# Patient Record
Sex: MALE | Race: BLACK OR AFRICAN AMERICAN | NOT HISPANIC OR LATINO | Employment: OTHER | RURAL
[De-identification: names, ages, dates, MRNs, and addresses within clinical notes are randomized per-mention and may not be internally consistent; named-entity substitution may affect disease eponyms.]

---

## 2022-11-15 DIAGNOSIS — M25.562 PAIN IN LEFT KNEE: Primary | ICD-10-CM

## 2022-11-28 ENCOUNTER — CLINICAL SUPPORT (OUTPATIENT)
Dept: REHABILITATION | Facility: HOSPITAL | Age: 62
End: 2022-11-28
Payer: OTHER GOVERNMENT

## 2022-11-28 DIAGNOSIS — M25.662 DECREASED RANGE OF MOTION OF LEFT KNEE: ICD-10-CM

## 2022-11-28 DIAGNOSIS — R29.898 WEAKNESS OF LEFT LEG: ICD-10-CM

## 2022-11-28 DIAGNOSIS — M25.562 ACUTE PAIN OF LEFT KNEE: Primary | ICD-10-CM

## 2022-11-28 DIAGNOSIS — M25.562 PAIN IN LEFT KNEE: ICD-10-CM

## 2022-11-28 PROCEDURE — 97110 THERAPEUTIC EXERCISES: CPT

## 2022-11-28 PROCEDURE — 97161 PT EVAL LOW COMPLEX 20 MIN: CPT

## 2022-11-28 NOTE — PLAN OF CARE
[facility]   Physical Therapy Initial Evaluation    Date: 11/28/2022   Name: Rayray Hutchins  Clinic Number: [id number]    Therapy Diagnosis: Left Knee Pain   Physician: [facility]     Physician Orders: PT Eval and Treat   Medical Diagnosis from Referral: Left Knee Pain   Evaluation Date: 11/28/2022  Updated Plan of Care Due : 12/27/2022  Authorization Period Expiration: 11/15/2023  Plan of Care Expiration: 1/27/2023  Visit # / Visits authorized: 1/ 16    Time In: 3:05 pm   Time Out: 4:00 pm 5  Total Appointment Time (timed & untimed codes): 55 minutes    Precautions: Standard    Subjective   Date of onset: 9/1/2022  History of current condition - Rayray reports: He underwent Left TKA July 2019, Revision October 2020, and he has undergone 4 different Left Knee Manipulation and Scar tissue removal procedures. Last Manipulation was in 2021. He is here today due to continued Left Knee Pain and dysfunction. Left Knee presents with Flexion contracture and Left Knee significantly larger than the Right Knee.      Medical History: DM    Surgical History:   Rayray Hutchins has hx of Left TKA, Left Total Knee Revision, and 4 manipulations/scar tissue removal procedures     Medications:   Rayray currently has no medications in their medication list.    Allergies:   Review of patient's allergies indicates:  Not on File     Imaging, None here     Prior Therapy: None at this facility since 2021  Social History:  lives alone  Occupation: Retired from [employer]; [employer]    Prior Level of Function: He was able to ambulate several years prior to the Left Knee Replacement  Current Level of Function: Since the first knee surgery in 2019 he has had continued pain and dysfunction in the Left Knee    Pain:  Current 8/10, worst 10/10, best 6/10   Location: left knee    Description: Aching, Dull, Throbbing, Tight, and Numb  Aggravating Factors: Prolonged standing and walking; trying to flex the knee   Easing  "Factors: nothing; He does take pain meds but this does not really help     Patients goals: "I just want to see if I can get my knee to bend more. I need some kind of relief from this pain."    Objective         Observation : Left Knee much larger than Right knee most likely due to build up of scar tissue    Incision :    Well healed years ago       Girth Measurements :      Right Lower Extremity :  Mid Patella 36  cm        Left Lower Extremity :  Mid Patella 42  cm          Range of Motion/Strength :                  Left Extremity                                                                        Right Extremity   AROM PROM Strength  Location  AROM    PROM   Strength        Hip      Flexion (140)                       Extension (10)                       Internal Rotation (40)                       External Rotation (50)                       Abduction (45)                       Adduction (30)       68   70  4/5   Knee    Flexion (140)  135   5/5     0   4/5                Extension (0)   0    5/5        Ankle   Dorsiflexion (20)                        Plantar Flexion (50)                        Inversion (35)                        Eversion (25)                     Functional Impairments :  Patient has difficulty with sit to stand especially from low chair. He shifts his weight to the Right in order to stand. Patient is noted to have decreased stance time and antalgic gait on the Left. Patient has decreased heel strike and hits foot flat with initial contact on the Left. He tends to keep the knee slightly flexed throughout gait cycle. Patient has decreased step/stride length resulting in slow michelle.        Limitation/Restriction for FOTO Intake Knee Survey    Therapist reviewed FOTO scores for Rayray Burnham Cuong on 11/28/2022.   FOTO documents entered into EPIC - see Media section.    Limitation Score: 65%         TREATMENT   Treatment Time In: 3:45 pm   Treatment Time Out: 3:55 pm   Total Treatment time " (time-based codes) separate from Evaluation: 10 minutes      Rayray received the treatments listed below:  THERAPEUTIC EXERCISES to develop strength, endurance, ROM, and flexibility for 10 minutes including :  Initiated the basic Home Exercise Program including exercises and stretches.      Home Exercises and Patient Education Provided    Education provided:   - Discussed the findings from the Evaluation, Reviewed the Plan of Care, and Instructed patient on their Home Exercise Program      Written Home Exercises Provided: yes.  Exercises were reviewed and Rayray was able to demonstrate them prior to the end of the session.  Rayray demonstrated good  understanding of the education provided.     See EMR under Patient Instructions for exercises provided 11/28/2022.    Assessment   Rayray is a 62 y.o. male referred to outpatient Physical Therapy with a medical diagnosis of Left Knee Pain. Rayray reports: He underwent Left TKA July 2019, Revision October 2020, and he has undergone 4 different Left Knee Manipulation and Scar tissue removal procedures. Last Manipulation was in 2021. He is here today due to continued Left Knee Pain and dysfunction. Left Knee presents with Flexion contracture and Left Knee significantly larger than the Right Knee. Patient presents with decreased Left Knee Flexion Range of Motion with hard end feel at end range. Left Knee is much larger than the Right most likely due to build up of scar tissue. Patient has difficulty with sit to stand especially from low chair. He shifts his weight to the Right in order to stand. Patient is noted to have decreased stance time and antalgic gait on the Left. Patient has decreased heel strike and hits foot flat with initial contact on the Left. He tends to keep the knee slightly flexed throughout gait cycle. Patient has decreased step/stride length resulting in slow michelle.  Patient will require Physical Therapy Intervention to address all deficits and work  toward completion of all goals set. Therapist will refer patient back to MD upon completion of Therapy for further assessment if pain and dysfunction persist.       Patient prognosis is Good.   Patientt will benefit from skilled outpatient Physical Therapy to address the deficits stated above and in the chart below, provide patient /family education, and to maximize patientt's level of independence.     Plan of care discussed with patient: Yes  Patient's spiritual, cultural and educational needs considered and patient is agreeable to the plan of care and goals as stated below:     Anticipated Barriers for therapy: Long Standing Left Knee Flexion Contracture    Goals:  Short Term Goals: 3 weeks   1. Independent with Home Exercise Program   2. Increase Left Knee Range of Motion to 0 Degrees to 90 Degrees  3. Increase Left Knee Strength to grossly 4+/5  4. Patient will ambulate 500 feet with Normal Gait pattern with complaints of pain Less than or Equal to 4/10.      Long Term Goals: 6 weeks   1. Patient will increase Left Knee Strength to grossly 5/5  2. Increase Left Knee Range of Motion to 0 Degrees to 100 Degrees  3. Patient will ambulate 1000+ feet with Good Heel strike on the Left with complaints of pain Less than or Equal to 3/10.        Plan   Plan of care Certification: 11/28/2022 to 1/27/2023.    Outpatient Physical Therapy 2 times weekly for 8 weeks to include the following interventions: Electrical Stimulation to increase strength and decrease pain and inflammation as neeeded, Gait Training, Manual Therapy, Moist Heat/ Ice, Neuromuscular Re-ed, Patient Education, Therapeutic Activities, Therapeutic Exercise, and Ultrasound.     RADHA GARCIA, PT, DPT      I CERTIFY THE NEED FOR THESE SERVICES FURNISHED UNDER THIS PLAN OF TREATMENT AND WHILE UNDER MY CARE.    Physician's comments:      Physician's Signature: ___________________________________________________

## 2022-11-28 NOTE — PROGRESS NOTES
See Plan of Care     Sup Visit performed today with IZABEL Perez and IZABEL Jackson.  All goals and treatment plan reviewed. Will work toward completion of all goals set.     First Treatment May Include :       Knee Exercises      Bike/Elliptical/Stairmaster    Calf Stretch    Hamstring Stretch    Quad Stretch    Cybex Leg Press - Bilateral    Cybex Leg Press - Single     Cybex Knee Extension    Cybex Hamstring Curls    Cybex Hip - Abduction    Cybex Hip - Flexion     Cybex Hip - Extension    Cable Knee TKE            Seated Knee Flexion Stretch         Prone Knee Flexion Stretch         NK Table

## 2022-11-30 ENCOUNTER — CLINICAL SUPPORT (OUTPATIENT)
Dept: REHABILITATION | Facility: HOSPITAL | Age: 62
End: 2022-11-30
Payer: OTHER GOVERNMENT

## 2022-11-30 DIAGNOSIS — M25.662 DECREASED RANGE OF MOTION OF LEFT KNEE: Primary | ICD-10-CM

## 2022-11-30 DIAGNOSIS — R29.898 WEAKNESS OF LEFT LEG: ICD-10-CM

## 2022-11-30 DIAGNOSIS — M25.562 ACUTE PAIN OF LEFT KNEE: ICD-10-CM

## 2022-11-30 PROCEDURE — 97140 MANUAL THERAPY 1/> REGIONS: CPT

## 2022-11-30 PROCEDURE — 97110 THERAPEUTIC EXERCISES: CPT

## 2022-11-30 NOTE — PROGRESS NOTES
[facility]   Physical Therapy Treatment Note     Name: Rayray Hutchins  Clinic Number: [id number]      Therapy Diagnosis: Left Knee Pain   Physician: Dylan Provider    Visit Date: 11/30/2022     Physician Orders: PT Eval and Treat   Medical Diagnosis from Referral: Left Knee Pain   Evaluation Date: 11/28/2022  Updated Plan of Care Due : 12/27/2022  Authorization Period Expiration: 11/15/2023  Plan of Care Expiration: 1/27/2023  Visit # / Visits authorized: 2/ 16      Time In: 1:00 pm (Front office had trouble signing him in the system)  Time Out: 2:01 pm   Total Billable Time: 60 minutes    Precautions: Standard  Functional Level at time of Evaluation:  Since the first knee surgery in 2019 he has had continued pain and dysfunction in the Left Knee     Subjective     Pt reports: He hasn't been able to do all the stretches every day because he was gone to [place], but he has done them some.  He was compliant with home exercise program.    Pain: 7/10  Location: left knee      Objective       Rayray received therapeutic exercises to develop strength, endurance, ROM, and flexibility for 45 minutes including:      Knee Exercises        NuStep  5 Min    Calf Stretch  4 x 15 sec    Hamstring Stretch  4 x 15 sec    Quad Stretch  8 x 15 sec    Cybex Leg Press - Bilateral  3 x 10 with 8 plates    Cybex Leg Press - Left Only   3 x 10 with 5 plates    Cybex Knee Extension     Cybex Hamstring Curls  3 x 10 with 4 plates    Cybex Hip - Abduction     Cybex Hip - Flexion      Cybex Hip - Extension     Cable Knee TKE                 Seated Knee Flexion Stretch            Prone Knee Flexion Stretch   4 x 30 sec hold with green strap         NK Table   Manual Therapy                                             Rayray received the following manual therapy techniques: Joint mobilizations, Myofacial release, and Soft tissue Mobilization were applied to the: Left Knee for 15 minutes,  including:  NK Table 4.3 and 14.3 lb weights on arm;  x 5 LAQs followed by a 2 Minute flexion stretch - this is repeated 4 times  Manual Passive Range of Motion for Extension and Flexion with main focus on flexion     Rayray participated in neuromuscular re-education activities to improve: Balance, Coordination, Kinesthetic, and Proprioception for 0 minutes. The following activities were included:  Single Leg Stance on Left               Home Exercises Provided and Patient Education Provided     Education provided: Reviewed his home exercise and stretching program and instructed him to stretch diligently at home    Written Home Exercises Provided: yes.  Exercises were reviewed and Rayray was able to demonstrate them prior to the end of the session.  Rayray demonstrated good  understanding of the education provided.     See EMR under Patient Instructions for exercises provided prior visit.    Assessment     Today is the patient's first visit since the Evaluation. Instructed patient on proper form for all exercises. Had patient warm up and stretch followed by Quad and Hamstring strengthening exercises. Patient was then placed on the NK chair for more aggressive stretching. 4.3 lb plate and 14.3 lb plate placed on arm;  LAQs x 5 followed by a 2 Minute flexion stretch - this was repeated 4 times. Therapist was able to increase knee flexion to 87 degrees following this. Also had patient perform prone knee flexion stretch with green strap 4 x 30 seconds. Will continue to work with patient on increasing knee flexion Range of Motion and strength.           PMH from the Evaluation : Rayray is a 62 y.o. male referred to outpatient Physical Therapy with a medical diagnosis of Left Knee Pain. Rayray reports: He underwent Left TKA July 2019, Revision October 2020, and he has undergone 4 different Left Knee Manipulation and Scar tissue removal procedures. Last Manipulation was in 2021. He is here today due to continued Left Knee  Pain and dysfunction. Left Knee presents with Flexion contracture and Left Knee significantly larger than the Right Knee. Patient presents with decreased Left Knee Flexion Range of Motion with hard end feel at end range. Left Knee is much larger than the Right most likely due to build up of scar tissue. Patient has difficulty with sit to stand especially from low chair. He shifts his weight to the Right in order to stand. Patient is noted to have decreased stance time and antalgic gait on the Left. Patient has decreased heel strike and hits foot flat with initial contact on the Left. He tends to keep the knee slightly flexed throughout gait cycle. Patient has decreased step/stride length resulting in slow michelle.  Patient will require Physical Therapy Intervention to address all deficits and work toward completion of all goals set. Therapist will refer patient back to MD upon completion of Therapy for further assessment if pain and dysfunction persist.          Rayray Is progressing well towards his goals.   Pt prognosis is Good.     Pt will continue to benefit from skilled outpatient physical therapy to address the deficits listed in the problem list box on initial evaluation, provide pt/family education and to maximize pt's level of independence in the home and community environment.     Pt's spiritual, cultural and educational needs considered and pt agreeable to plan of care and goals.     Anticipated Barriers for therapy: Long Standing Left Knee Flexion Contracture     Goals:  Short Term Goals: 3 weeks   1. Independent with Home Exercise Program   2. Increase Left Knee Range of Motion to 0 Degrees to 90 Degrees  3. Increase Left Knee Strength to grossly 4+/5  4. Patient will ambulate 500 feet with Normal Gait pattern with complaints of pain Less than or Equal to 4/10.       Long Term Goals: 6 weeks   1. Patient will increase Left Knee Strength to grossly 5/5  2. Increase Left Knee Range of Motion to 0 Degrees  to 100 Degrees  3. Patient will ambulate 1000+ feet with Good Heel strike on the Left with complaints of pain Less than or Equal to 3/10.       Plan     Plan of care Certification: 11/28/2022 to 1/27/2023.     Outpatient Physical Therapy 2 times weekly for 8 weeks to include the following interventions: Electrical Stimulation to increase strength and decrease pain and inflammation as neeeded, Gait Training, Manual Therapy, Moist Heat/ Ice, Neuromuscular Re-ed, Patient Education, Therapeutic Activities, Therapeutic Exercise, and Ultrasound.        RADHA GARCIA, PT, DPT   11/30/2022

## 2022-12-05 ENCOUNTER — CLINICAL SUPPORT (OUTPATIENT)
Dept: REHABILITATION | Facility: HOSPITAL | Age: 62
End: 2022-12-05
Payer: OTHER GOVERNMENT

## 2022-12-05 DIAGNOSIS — R29.898 WEAKNESS OF LEFT LEG: ICD-10-CM

## 2022-12-05 DIAGNOSIS — M25.662 DECREASED RANGE OF MOTION OF LEFT KNEE: Primary | ICD-10-CM

## 2022-12-05 DIAGNOSIS — M25.562 ACUTE PAIN OF LEFT KNEE: ICD-10-CM

## 2022-12-05 PROCEDURE — 97140 MANUAL THERAPY 1/> REGIONS: CPT | Mod: CQ

## 2022-12-05 PROCEDURE — 97110 THERAPEUTIC EXERCISES: CPT | Mod: CQ

## 2022-12-05 NOTE — PROGRESS NOTES
[facility]   Physical Therapy Treatment Note     Name: Rayray Hutchins  Clinic Number: [id number]      Therapy Diagnosis: Left Knee Pain   Physician: Regina Almonte FNP    Visit Date: 12/5/2022     Physician Orders: PT Eval and Treat   Medical Diagnosis from Referral: Left Knee Pain   Evaluation Date: 11/28/2022  Updated Plan of Care Due : 12/27/2022  Authorization Period Expiration: 11/15/2023  Plan of Care Expiration: 1/27/2023  Visit # / Visits authorized: 3 / 16  PTA VISIT 1/5      Time In: 3:19 pm   Time Out: 4:15 pm   Total Billable Time: 56 minutes    Precautions: Standard  Functional Level at time of Evaluation:  Since the first knee surgery in 2019 he has had continued pain and dysfunction in the Left Knee     Subjective     Pt reports: 7/10 pain noted today  He was compliant with home exercise program.    Pain: 7/10  Location: left knee      Objective       Rayray received therapeutic exercises to develop strength, endurance, ROM, and flexibility for 15 minutes including:      Knee Exercises        NuStep  5 Min    Calf Stretch  4 x 15 sec    Hamstring Stretch  4 x 15 sec    Quad Stretch  8 x 15 sec    Cybex Leg Press - Bilateral    Cybex Leg Press - Left Only     Cybex Knee Extension     Cybex Hamstring Curls     Cybex Hip - Abduction     Cybex Hip - Flexion      Cybex Hip - Extension     Cable Knee TKE                 Seated Knee Flexion Stretch            Prone Knee Flexion Stretch   4 x 30 sec hold with green strap         NK Table   Manual Therapy                                             Rayray received the following manual therapy techniques: Joint mobilizations, Myofacial release, and Soft tissue Mobilization were applied to the: Left Knee for 30 minutes, including:  Graston  Manual Prone Quad  Progressive Flexion Bridges    Rayray participated in neuromuscular re-education activities to improve: Balance, Coordination, Kinesthetic, and Proprioception for  0 minutes. The following activities were included:  Single Leg Stance on Left               Home Exercises Provided and Patient Education Provided     Education provided: Reviewed his home exercise and stretching program and instructed him to stretch diligently at home    Written Home Exercises Provided: yes.  Exercises were reviewed and Rayray was able to demonstrate them prior to the end of the session.  Rayray demonstrated good  understanding of the education provided.     See EMR under Patient Instructions for exercises provided prior visit.    Assessment     Pt has moderate quad tightness/restriction during PROM. No change in ROM with lateral/medial MWM. Graston performed today with GT-4, 5, and 3 instruments. Moderate adhesions noted along VMO and medial/lateral to incision. Essentially no change in ROM after all manual techniques. Discussed with PT and patient about possible splinting (cindi splint and JOSHUA splint) for pt to use to help regain flexion ROM. Pt is willing to try any means to regain mobility. Will talk to appropriate people to facilitate this for patient.          PMH from the Evaluation : Rayray is a 62 y.o. male referred to outpatient Physical Therapy with a medical diagnosis of Left Knee Pain. Rayray reports: He underwent Left TKA July 2019, Revision October 2020, and he has undergone 4 different Left Knee Manipulation and Scar tissue removal procedures. Last Manipulation was in 2021. He is here today due to continued Left Knee Pain and dysfunction. Left Knee presents with Flexion contracture and Left Knee significantly larger than the Right Knee. Patient presents with decreased Left Knee Flexion Range of Motion with hard end feel at end range. Left Knee is much larger than the Right most likely due to build up of scar tissue. Patient has difficulty with sit to stand especially from low chair. He shifts his weight to the Right in order to stand. Patient is noted to have decreased stance time  and antalgic gait on the Left. Patient has decreased heel strike and hits foot flat with initial contact on the Left. He tends to keep the knee slightly flexed throughout gait cycle. Patient has decreased step/stride length resulting in slow michelle.  Patient will require Physical Therapy Intervention to address all deficits and work toward completion of all goals set. Therapist will refer patient back to MD upon completion of Therapy for further assessment if pain and dysfunction persist.          Rayray Is progressing well towards his goals.   Pt prognosis is Good.     Pt will continue to benefit from skilled outpatient physical therapy to address the deficits listed in the problem list box on initial evaluation, provide pt/family education and to maximize pt's level of independence in the home and community environment.     Pt's spiritual, cultural and educational needs considered and pt agreeable to plan of care and goals.     Anticipated Barriers for therapy: Long Standing Left Knee Flexion Contracture     Goals:  Short Term Goals: 3 weeks   1. Independent with Home Exercise Program   2. Increase Left Knee Range of Motion to 0 Degrees to 90 Degrees  3. Increase Left Knee Strength to grossly 4+/5  4. Patient will ambulate 500 feet with Normal Gait pattern with complaints of pain Less than or Equal to 4/10.       Long Term Goals: 6 weeks   1. Patient will increase Left Knee Strength to grossly 5/5  2. Increase Left Knee Range of Motion to 0 Degrees to 100 Degrees  3. Patient will ambulate 1000+ feet with Good Heel strike on the Left with complaints of pain Less than or Equal to 3/10.       Plan     Plan of care Certification: 11/28/2022 to 1/27/2023.     Outpatient Physical Therapy 2 times weekly for 8 weeks to include the following interventions: Electrical Stimulation to increase strength and decrease pain and inflammation as neeeded, Gait Training, Manual Therapy, Moist Heat/ Ice, Neuromuscular Re-ed, Patient  Education, Therapeutic Activities, Therapeutic Exercise, and Ultrasound.        Derrick Carr, PTA, DPT   12/5/2022

## 2022-12-07 ENCOUNTER — CLINICAL SUPPORT (OUTPATIENT)
Dept: REHABILITATION | Facility: HOSPITAL | Age: 62
End: 2022-12-07
Payer: OTHER GOVERNMENT

## 2022-12-07 DIAGNOSIS — M25.562 ACUTE PAIN OF LEFT KNEE: ICD-10-CM

## 2022-12-07 DIAGNOSIS — M25.662 DECREASED RANGE OF MOTION OF LEFT KNEE: Primary | ICD-10-CM

## 2022-12-07 DIAGNOSIS — R29.898 WEAKNESS OF LEFT LEG: ICD-10-CM

## 2022-12-07 PROCEDURE — 97140 MANUAL THERAPY 1/> REGIONS: CPT | Mod: CQ

## 2022-12-07 PROCEDURE — 97110 THERAPEUTIC EXERCISES: CPT | Mod: CQ

## 2022-12-07 NOTE — PROGRESS NOTES
"  [facility]   Physical Therapy Treatment Note     Name: Rayray Hutchins  Clinic Number: [id number]      Therapy Diagnosis: Left Knee Pain   Physician: Dylan, Provider    Visit Date: 12/7/2022     Physician Orders: PT Eval and Treat   Medical Diagnosis from Referral: Left Knee Pain   Evaluation Date: 11/28/2022  Updated Plan of Care Due : 12/27/2022  Authorization Period Expiration: 11/15/2023  Plan of Care Expiration: 1/27/2023  Visit # / Visits authorized: 4/16  PTA VISIT 2/5      Time In: 1:02 pm   Time Out: 1:55 pm   Total Billable Time: 53 minutes    Precautions: Standard  Functional Level at time of Evaluation:  Since the first knee surgery in 2019 he has had continued pain and dysfunction in the Left Knee     Subjective     Pt reports: 7/10 pain noted today  He was compliant with home exercise program.    Pain: 7/10  Location: left knee      Objective       Rayray received therapeutic exercises to develop strength, endurance, ROM, and flexibility for 23 minutes including:      Knee Exercises        NuStep  5 Min    Calf Stretch  4 x 15 sec    Hamstring Stretch  4 x 15 sec    Quad Stretch  8 x 15 sec    Cybex Leg Press - Bilateral  10 plates 30x   Cybex Leg Press - Left Only   5 plates 30x   Cybex Knee Extension     Cybex Hamstring Curls  5 plates 30x   Cybex Hip - Abduction     Cybex Hip - Flexion      Cybex Hip - Extension     Cable Knee TKE                 Seated Knee Flexion Stretch            Prone Knee Flexion Stretch   4 x 30 sec hold with green strap         NK Table   Manual Therapy           Lat Stepdowns  6" 30x    Eccentric Stepdowns  30x                          Rayray received the following manual therapy techniques: Joint mobilizations, Myofacial release, and Soft tissue Mobilization were applied to the: Left Knee for 30 minutes, including:  Graston  Manual Prone Quad  Progressive Flexion Bridges    Rayray participated in neuromuscular re-education " activities to improve: Balance, Coordination, Kinesthetic, and Proprioception for 0 minutes. The following activities were included:  Single Leg Stance on Left               Home Exercises Provided and Patient Education Provided     Education provided: Reviewed his home exercise and stretching program and instructed him to stretch diligently at home    Written Home Exercises Provided: yes.  Exercises were reviewed and Rayray was able to demonstrate them prior to the end of the session.  Rayray demonstrated good  understanding of the education provided.     See EMR under Patient Instructions for exercises provided prior visit.    Assessment     No change in ROM/tightness today. Gifty performed today utilizing same techniques and instruments. Mild adhesions noted along VMO and border of incision. Progressed LE strengthening today with no increased pain noted. In communication with Bernardino and JOSHUA Wade about a possible consult. Pt wishes to try JOSHUA Splint so will work on appropriate paperwork to facilitate this. Educated pt to be diligent with his HEP and ROM every day.           PMH from the Evaluation : Rayray is a 62 y.o. male referred to outpatient Physical Therapy with a medical diagnosis of Left Knee Pain. Rayray reports: He underwent Left TKA July 2019, Revision October 2020, and he has undergone 4 different Left Knee Manipulation and Scar tissue removal procedures. Last Manipulation was in 2021. He is here today due to continued Left Knee Pain and dysfunction. Left Knee presents with Flexion contracture and Left Knee significantly larger than the Right Knee. Patient presents with decreased Left Knee Flexion Range of Motion with hard end feel at end range. Left Knee is much larger than the Right most likely due to build up of scar tissue. Patient has difficulty with sit to stand especially from low chair. He shifts his weight to the Right in order to stand. Patient is noted to have decreased stance time  and antalgic gait on the Left. Patient has decreased heel strike and hits foot flat with initial contact on the Left. He tends to keep the knee slightly flexed throughout gait cycle. Patient has decreased step/stride length resulting in slow michelle.  Patient will require Physical Therapy Intervention to address all deficits and work toward completion of all goals set. Therapist will refer patient back to MD upon completion of Therapy for further assessment if pain and dysfunction persist.          Rayray Is progressing well towards his goals.   Pt prognosis is Good.     Pt will continue to benefit from skilled outpatient physical therapy to address the deficits listed in the problem list box on initial evaluation, provide pt/family education and to maximize pt's level of independence in the home and community environment.     Pt's spiritual, cultural and educational needs considered and pt agreeable to plan of care and goals.     Anticipated Barriers for therapy: Long Standing Left Knee Flexion Contracture     Goals:  Short Term Goals: 3 weeks   1. Independent with Home Exercise Program   2. Increase Left Knee Range of Motion to 0 Degrees to 90 Degrees  3. Increase Left Knee Strength to grossly 4+/5  4. Patient will ambulate 500 feet with Normal Gait pattern with complaints of pain Less than or Equal to 4/10.       Long Term Goals: 6 weeks   1. Patient will increase Left Knee Strength to grossly 5/5  2. Increase Left Knee Range of Motion to 0 Degrees to 100 Degrees  3. Patient will ambulate 1000+ feet with Good Heel strike on the Left with complaints of pain Less than or Equal to 3/10.       Plan     Plan of care Certification: 11/28/2022 to 1/27/2023.     Outpatient Physical Therapy 2 times weekly for 8 weeks to include the following interventions: Electrical Stimulation to increase strength and decrease pain and inflammation as neeeded, Gait Training, Manual Therapy, Moist Heat/ Ice, Neuromuscular Re-ed, Patient  Education, Therapeutic Activities, Therapeutic Exercise, and Ultrasound.        Derrick Carr, PTA,   12/7/2022

## 2022-12-12 ENCOUNTER — CLINICAL SUPPORT (OUTPATIENT)
Dept: REHABILITATION | Facility: HOSPITAL | Age: 62
End: 2022-12-12
Payer: OTHER GOVERNMENT

## 2022-12-12 DIAGNOSIS — R29.898 WEAKNESS OF LEFT LEG: ICD-10-CM

## 2022-12-12 DIAGNOSIS — M25.562 ACUTE PAIN OF LEFT KNEE: ICD-10-CM

## 2022-12-12 DIAGNOSIS — M25.662 DECREASED RANGE OF MOTION OF LEFT KNEE: Primary | ICD-10-CM

## 2022-12-12 PROCEDURE — 97110 THERAPEUTIC EXERCISES: CPT | Mod: CQ

## 2022-12-12 NOTE — PROGRESS NOTES
"  [facility]   Physical Therapy Treatment Note     Name: Rayray Hutchins  Clinic Number: [id number]      Therapy Diagnosis: Left Knee Pain   Physician: Regina Almonte FNP    Visit Date: 12/12/2022     Physician Orders: PT Eval and Treat   Medical Diagnosis from Referral: Left Knee Pain   Evaluation Date: 11/28/2022  Updated Plan of Care Due : 12/27/2022  Authorization Period Expiration: 11/15/2023  Plan of Care Expiration: 1/27/2023  Visit # / Visits authorized: 5/16  PTA VISIT 3/5      Time In: 1:48 pm   Time Out: 2:32 pm   Total Billable Time: 44 minutes    Precautions: Standard  Functional Level at time of Evaluation:  Since the first knee surgery in 2019 he has had continued pain and dysfunction in the Left Knee     Subjective     Pt reports: 7/10 pain noted today. No change in ROM noted  He was compliant with home exercise program.    Pain: 7/10  Location: left knee      Objective       Rayray received therapeutic exercises to develop strength, endurance, ROM, and flexibility for 38 minutes including:      Knee Exercises        NuStep  5 Min    Calf Stretch  5x20 seconds   Hamstring Stretch  5x20 seconds   Quad Stretch  8 x 15 sec    Cybex Leg Press - Bilateral  10 plates 30x   Cybex Leg Press - Left Only   7 plates 30x   Cybex Knee Extension     Cybex Hamstring Curls  5 plates 30x SINGLE LEG   Cybex Hip - Abduction     Cybex Hip - Flexion      Cybex Hip - Extension     Cable Knee TKE                 Seated Knee Flexion Stretch            Prone Knee Flexion Stretch   4 x 30 sec hold with green strap         NK Table   Manual Therapy           Lat Stepdowns  6" 30x    Eccentric Stepdowns  30x                          Rayray received the following manual therapy techniques: Joint mobilizations, Myofacial release, and Soft tissue Mobilization were applied to the: Left Knee for 0 minutes, including:  Graston  Manual Prone Quad  Progressive Flexion Bridges    Rayray " participated in neuromuscular re-education activities to improve: Balance, Coordination, Kinesthetic, and Proprioception for 0 minutes. The following activities were included:  Single Leg Stance on Left               Home Exercises Provided and Patient Education Provided     Education provided: Reviewed his home exercise and stretching program and instructed him to stretch diligently at home    Written Home Exercises Provided: yes.  Exercises were reviewed and Rayray was able to demonstrate them prior to the end of the session.  Rayray demonstrated good  understanding of the education provided.     See EMR under Patient Instructions for exercises provided prior visit.    Assessment     Pt tolerated all therapeutic exercises today with pain/swelling noted. Continued limitations in flexion ROM with tightness felt across quad tendon. Progressed LE strengthening per pt tolerance. Measurements taken today for JOSHUA Splint and pt given information packet and [facility]. Will continue working on ROM/strength until splint is approved and fit for patient. Educated pt to call if any additional documents required on our end for JOSHUA splint.           PMH from the Evaluation : Rayray is a 62 y.o. male referred to outpatient Physical Therapy with a medical diagnosis of Left Knee Pain. Rayray reports: He underwent Left TKA July 2019, Revision October 2020, and he has undergone 4 different Left Knee Manipulation and Scar tissue removal procedures. Last Manipulation was in 2021. He is here today due to continued Left Knee Pain and dysfunction. Left Knee presents with Flexion contracture and Left Knee significantly larger than the Right Knee. Patient presents with decreased Left Knee Flexion Range of Motion with hard end feel at end range. Left Knee is much larger than the Right most likely due to build up of scar tissue. Patient has difficulty with sit to stand especially from low chair. He shifts his weight to the  Right in order to stand. Patient is noted to have decreased stance time and antalgic gait on the Left. Patient has decreased heel strike and hits foot flat with initial contact on the Left. He tends to keep the knee slightly flexed throughout gait cycle. Patient has decreased step/stride length resulting in slow michelle.  Patient will require Physical Therapy Intervention to address all deficits and work toward completion of all goals set. Therapist will refer patient back to MD upon completion of Therapy for further assessment if pain and dysfunction persist.          Rayray Is progressing well towards his goals.   Pt prognosis is Good.     Pt will continue to benefit from skilled outpatient physical therapy to address the deficits listed in the problem list box on initial evaluation, provide pt/family education and to maximize pt's level of independence in the home and community environment.     Pt's spiritual, cultural and educational needs considered and pt agreeable to plan of care and goals.     Anticipated Barriers for therapy: Long Standing Left Knee Flexion Contracture     Goals:  Short Term Goals: 3 weeks   1. Independent with Home Exercise Program   2. Increase Left Knee Range of Motion to 0 Degrees to 90 Degrees  3. Increase Left Knee Strength to grossly 4+/5  4. Patient will ambulate 500 feet with Normal Gait pattern with complaints of pain Less than or Equal to 4/10.       Long Term Goals: 6 weeks   1. Patient will increase Left Knee Strength to grossly 5/5  2. Increase Left Knee Range of Motion to 0 Degrees to 100 Degrees  3. Patient will ambulate 1000+ feet with Good Heel strike on the Left with complaints of pain Less than or Equal to 3/10.       Plan     Plan of care Certification: 11/28/2022 to 1/27/2023.     Outpatient Physical Therapy 2 times weekly for 8 weeks to include the following interventions: Electrical Stimulation to increase strength and decrease pain and inflammation as neeeded, Gait  Training, Manual Therapy, Moist Heat/ Ice, Neuromuscular Re-ed, Patient Education, Therapeutic Activities, Therapeutic Exercise, and Ultrasound.        Derrick Carr, PTA,   12/12/2022

## 2022-12-16 ENCOUNTER — CLINICAL SUPPORT (OUTPATIENT)
Dept: REHABILITATION | Facility: HOSPITAL | Age: 62
End: 2022-12-16
Payer: OTHER GOVERNMENT

## 2022-12-16 DIAGNOSIS — M25.662 DECREASED RANGE OF MOTION OF LEFT KNEE: Primary | ICD-10-CM

## 2022-12-16 DIAGNOSIS — M25.562 ACUTE PAIN OF LEFT KNEE: ICD-10-CM

## 2022-12-16 DIAGNOSIS — R29.898 WEAKNESS OF LEFT LEG: ICD-10-CM

## 2022-12-16 PROCEDURE — 97140 MANUAL THERAPY 1/> REGIONS: CPT

## 2022-12-16 PROCEDURE — 97110 THERAPEUTIC EXERCISES: CPT

## 2022-12-16 NOTE — PROGRESS NOTES
"  [facility]   Physical Therapy Treatment Note     Name: Rayray Hutchins  Clinic Number: [id number]      Therapy Diagnosis: Left Knee Pain   Physician: Dylan, Provider    Visit Date: 12/16/2022     Physician Orders: PT Eval and Treat   Medical Diagnosis from Referral: Left Knee Pain   Evaluation Date: 11/28/2022  Updated Plan of Care Due : 12/27/2022  Authorization Period Expiration: 11/15/2023  Plan of Care Expiration: 1/27/2023  Visit # / Visits authorized: 6/16  PTA VISIT 3/5      Time In: 10:00 am    Time Out: 10:55 am  Total Billable Time: 55 minutes    Precautions: Standard  Functional Level at time of Evaluation:  Since the first knee surgery in 2019 he has had continued pain and dysfunction in the Left Knee     Subjective     Pt reports: that he is trying to get [facility] to write order for JOSHUA.   He was compliant with home exercise program.    Pain: 6/10  Location: left knee      Objective       Rayray received therapeutic exercises to develop strength, endurance, ROM, and flexibility for 30 minutes including:      Knee Exercises        NuStep  5 Min    Calf Stretch  5x20 seconds   Hamstring Stretch  5x20 seconds   Quad Stretch  8 x 15 sec    Cybex Leg Press - Bilateral  10 plates 3 x 10   Cybex Leg Press - Left Only   7 plates 3 x 10   Cybex Knee Extension  3 x 10 3 plates   Cybex Hamstring Curls  5 plates 3 x 10 SINGLE LEG   Cybex Hip - Abduction     Cybex Hip - Flexion      Cybex Hip - Extension     Cable Knee TKE                 Seated Knee Flexion Stretch            Prone Knee Flexion Stretch   4 x 30 sec hold with green strap         NK Table   Manual Therapy           Lat Stepdowns  6" 3 x 10    Eccentric Stepdowns  30x                          Rayray received the following manual therapy techniques: Joint mobilizations, Myofacial release, and Soft tissue Mobilization were applied to the: Left Knee for 25 minutes, including:  Graston with GT to left quad in " prolonged stretch position  Manual Prone Quad  Progressive Flexion Bridges  NK chair for progressive creep stretch     Rayray participated in neuromuscular re-education activities to improve: Balance, Coordination, Kinesthetic, and Proprioception for 0 minutes. The following activities were included:  Single Leg Stance on Left               Home Exercises Provided and Patient Education Provided     Education provided: Reviewed his home exercise and stretching program and instructed him to stretch diligently at home    Written Home Exercises Provided: yes.  Exercises were reviewed and Rayray was able to demonstrate them prior to the end of the session.  Rayray demonstrated good  understanding of the education provided.     See EMR under Patient Instructions for exercises provided prior visit.    Assessment     Patient presents with increased edema left knee today. Added Cybex knee extensions today. Flexion today was 80 degrees after warm up and 95 degrees after Graston and NK chair creep stretch. Patient instructed to perform prolonged stretching over the weekend.      PMH from the Evaluation : Rayray is a 62 y.o. male referred to outpatient Physical Therapy with a medical diagnosis of Left Knee Pain. Rayray reports: He underwent Left TKA July 2019, Revision October 2020, and he has undergone 4 different Left Knee Manipulation and Scar tissue removal procedures. Last Manipulation was in 2021. He is here today due to continued Left Knee Pain and dysfunction. Left Knee presents with Flexion contracture and Left Knee significantly larger than the Right Knee. Patient presents with decreased Left Knee Flexion Range of Motion with hard end feel at end range. Left Knee is much larger than the Right most likely due to build up of scar tissue. Patient has difficulty with sit to stand especially from low chair. He shifts his weight to the Right in order to stand. Patient is noted to have decreased stance time and antalgic gait  on the Left. Patient has decreased heel strike and hits foot flat with initial contact on the Left. He tends to keep the knee slightly flexed throughout gait cycle. Patient has decreased step/stride length resulting in slow michelle.  Patient will require Physical Therapy Intervention to address all deficits and work toward completion of all goals set. Therapist will refer patient back to MD upon completion of Therapy for further assessment if pain and dysfunction persist.          Rayray Is progressing well towards his goals.   Pt prognosis is Good.     Pt will continue to benefit from skilled outpatient physical therapy to address the deficits listed in the problem list box on initial evaluation, provide pt/family education and to maximize pt's level of independence in the home and community environment.     Pt's spiritual, cultural and educational needs considered and pt agreeable to plan of care and goals.     Anticipated Barriers for therapy: Long Standing Left Knee Flexion Contracture     Goals:  Short Term Goals: 3 weeks   1. Independent with Home Exercise Program   2. Increase Left Knee Range of Motion to 0 Degrees to 90 Degrees  3. Increase Left Knee Strength to grossly 4+/5  4. Patient will ambulate 500 feet with Normal Gait pattern with complaints of pain Less than or Equal to 4/10.       Long Term Goals: 6 weeks   1. Patient will increase Left Knee Strength to grossly 5/5  2. Increase Left Knee Range of Motion to 0 Degrees to 100 Degrees  3. Patient will ambulate 1000+ feet with Good Heel strike on the Left with complaints of pain Less than or Equal to 3/10.       Plan     Plan of care Certification: 11/28/2022 to 1/27/2023.     Outpatient Physical Therapy 2 times weekly for 8 weeks to include the following interventions: Electrical Stimulation to increase strength and decrease pain and inflammation as neeeded, Gait Training, Manual Therapy, Moist Heat/ Ice, Neuromuscular Re-ed, Patient Education,  Therapeutic Activities, Therapeutic Exercise, and Ultrasound.        HERMELINDA GARCIA, PT, MLT    12/16/2022

## 2023-01-03 ENCOUNTER — CLINICAL SUPPORT (OUTPATIENT)
Dept: REHABILITATION | Facility: HOSPITAL | Age: 63
End: 2023-01-03
Payer: OTHER GOVERNMENT

## 2023-01-03 DIAGNOSIS — R29.898 WEAKNESS OF LEFT LEG: ICD-10-CM

## 2023-01-03 DIAGNOSIS — M25.662 DECREASED RANGE OF MOTION OF LEFT KNEE: Primary | ICD-10-CM

## 2023-01-03 DIAGNOSIS — M25.562 ACUTE PAIN OF LEFT KNEE: ICD-10-CM

## 2023-01-03 PROCEDURE — 97110 THERAPEUTIC EXERCISES: CPT | Mod: CQ

## 2023-01-03 NOTE — PROGRESS NOTES
"  [facility]   Physical Therapy Treatment Note     Name: Rayray Hutchins  Clinic Number: [id number]      Therapy Diagnosis: Left Knee Pain   Physician: Dylan, Provider    Visit Date: 1/3/2023     Physician Orders: PT Eval and Treat   Medical Diagnosis from Referral: Left Knee Pain   Evaluation Date: 11/28/2022  Updated Plan of Care Due : 12/27/2022  Authorization Period Expiration: 11/15/2023  Plan of Care Expiration: 1/27/2023  Visit # / Visits authorized: 7/16  PTA VISIT 1/5      Time In: 1:00 pm    Time Out: 1:43 pm  Total Billable Time: 43 minutes    Precautions: Standard  Functional Level at time of Evaluation:  Since the first knee surgery in 2019 he has had continued pain and dysfunction in the Left Knee     Subjective     Pt reports: soreness today. Awaiting MD to approve JOSHUA splint  He was compliant with home exercise program.    Pain: 6/10  Location: left knee      Objective       Rayray received therapeutic exercises to develop strength, endurance, ROM, and flexibility for 40 minutes including:      Knee Exercises        NuStep  5 Min    Calf Stretch  5x20 seconds   Hamstring Stretch  5x20 seconds   Quad Stretch  8 x 15 sec    Cybex Leg Press - Bilateral  10 plates 3 x 10   Cybex Leg Press - Left Only   7 plates 3 x 10   Cybex Knee Extension  3 x 10 3 plates   Cybex Hamstring Curls  5 plates 3 x 10 SINGLE LEG   Cybex Hip - Abduction     Cybex Hip - Flexion      Cybex Hip - Extension     Cable Knee TKE     Supine Ball Rolls into Flexion   30x   SL progressive bridges   3x10              Prone Knee Flexion Stretch   4 x 30 sec hold with green strap         NK Table   Manual Therapy           Lat Stepdowns  6" 3 x 10    Eccentric Stepdowns  30x    Progressive LAQ with Band  [id number] x 10 sec hold                    Rayray received the following manual therapy techniques: Joint mobilizations, Myofacial release, and Soft tissue Mobilization were applied to the: Left Knee " for 0 minutes, including:  Graston with GT to left quad in prolonged stretch position  Manual Prone Quad  Progressive Flexion Bridges  NK chair for progressive creep stretch     Rayray participated in neuromuscular re-education activities to improve: Balance, Coordination, Kinesthetic, and Proprioception for 0 minutes. The following activities were included:  Single Leg Stance on Left               Home Exercises Provided and Patient Education Provided     Education provided: Reviewed his home exercise and stretching program and instructed him to stretch diligently at home    Written Home Exercises Provided: yes.  Exercises were reviewed and Rayray was able to demonstrate them prior to the end of the session.  Rayray demonstrated good  understanding of the education provided.     See EMR under Patient Instructions for exercises provided prior visit.    Assessment     Pt's knee continues to be very tight into flexion. Extension ROM in holding and maintaining neutral. Progressed LE strengthening with fatigue noted. Ended with LAQ isometrics with pull into end range flexion during rest breaks. Pt is scheduled to talk to MD in two weeks about JOSHUA splint approval.       PMH from the Evaluation : Rayray is a 62 y.o. male referred to outpatient Physical Therapy with a medical diagnosis of Left Knee Pain. Rayray reports: He underwent Left TKA July 2019, Revision October 2020, and he has undergone 4 different Left Knee Manipulation and Scar tissue removal procedures. Last Manipulation was in 2021. He is here today due to continued Left Knee Pain and dysfunction. Left Knee presents with Flexion contracture and Left Knee significantly larger than the Right Knee. Patient presents with decreased Left Knee Flexion Range of Motion with hard end feel at end range. Left Knee is much larger than the Right most likely due to build up of scar tissue. Patient has difficulty with sit to stand especially from low chair. He shifts his  weight to the Right in order to stand. Patient is noted to have decreased stance time and antalgic gait on the Left. Patient has decreased heel strike and hits foot flat with initial contact on the Left. He tends to keep the knee slightly flexed throughout gait cycle. Patient has decreased step/stride length resulting in slow michelle.  Patient will require Physical Therapy Intervention to address all deficits and work toward completion of all goals set. Therapist will refer patient back to MD upon completion of Therapy for further assessment if pain and dysfunction persist.          Rayray Is progressing well towards his goals.   Pt prognosis is Good.     Pt will continue to benefit from skilled outpatient physical therapy to address the deficits listed in the problem list box on initial evaluation, provide pt/family education and to maximize pt's level of independence in the home and community environment.     Pt's spiritual, cultural and educational needs considered and pt agreeable to plan of care and goals.     Anticipated Barriers for therapy: Long Standing Left Knee Flexion Contracture     Goals:  Short Term Goals: 3 weeks   1. Independent with Home Exercise Program   2. Increase Left Knee Range of Motion to 0 Degrees to 90 Degrees  3. Increase Left Knee Strength to grossly 4+/5  4. Patient will ambulate 500 feet with Normal Gait pattern with complaints of pain Less than or Equal to 4/10.       Long Term Goals: 6 weeks   1. Patient will increase Left Knee Strength to grossly 5/5  2. Increase Left Knee Range of Motion to 0 Degrees to 100 Degrees  3. Patient will ambulate 1000+ feet with Good Heel strike on the Left with complaints of pain Less than or Equal to 3/10.       Plan     Plan of care Certification: 11/28/2022 to 1/27/2023.     Outpatient Physical Therapy 2 times weekly for 8 weeks to include the following interventions: Electrical Stimulation to increase strength and decrease pain and inflammation as  neeeded, Gait Training, Manual Therapy, Moist Heat/ Ice, Neuromuscular Re-ed, Patient Education, Therapeutic Activities, Therapeutic Exercise, and Ultrasound.        Derrick Carr, PTA,     1/3/2023

## 2023-01-06 ENCOUNTER — CLINICAL SUPPORT (OUTPATIENT)
Dept: REHABILITATION | Facility: HOSPITAL | Age: 63
End: 2023-01-06
Payer: OTHER GOVERNMENT

## 2023-01-06 DIAGNOSIS — R29.898 WEAKNESS OF LEFT LEG: ICD-10-CM

## 2023-01-06 DIAGNOSIS — M25.562 ACUTE PAIN OF LEFT KNEE: ICD-10-CM

## 2023-01-06 DIAGNOSIS — M25.662 DECREASED RANGE OF MOTION OF LEFT KNEE: Primary | ICD-10-CM

## 2023-01-06 PROCEDURE — 97110 THERAPEUTIC EXERCISES: CPT

## 2023-01-06 PROCEDURE — 97140 MANUAL THERAPY 1/> REGIONS: CPT

## 2023-01-06 NOTE — PROGRESS NOTES
" [facility]   Physical Therapy Treatment Note     Name: Rayray Hutchins  Clinic Number: [id number]      Therapy Diagnosis: Left Knee Pain   Physician: Dylan, Provider    Visit Date: 1/6/2023     Physician Orders: PT Eval and Treat   Medical Diagnosis from Referral: Left Knee Pain   Evaluation Date: 11/28/2022  Updated Plan of Care Due : 2/5/2022  Authorization Period Expiration: 11/15/2023  Plan of Care Expiration: 2/5/2023  Visit # / Visits authorized: 8/16  PTA VISIT 1/5      Time In: 9:55 am    Time Out: 10:50 am  Total Billable Time: 55 minutes    Precautions: Standard  Functional Level at time of Evaluation:  Since the first knee surgery in 2019 he has had continued pain and dysfunction in the Left Knee     Subjective     Pt reports: He has been doing his exercises and stretches at home.  He was compliant with home exercise program.    Pain: 6-7/10  Location: left knee      Objective       Rayray received therapeutic exercises to develop strength, endurance, ROM, and flexibility for 45 minutes including:      Knee Exercises        NuStep  5 Min    Calf Stretch  5x20 seconds   Hamstring Stretch  5x20 seconds   Quad Stretch  8 x 15 sec    Cybex Leg Press - Bilateral  10 plates 3 x 10   Cybex Leg Press - Left Only   7 plates 3 x 10   Cybex Knee Extension  3 x 10 3 plates   Cybex Hamstring Curls  5 plates 3 x 10 SINGLE LEG   Cybex Hip - Abduction     Cybex Hip - Flexion      Cybex Hip - Extension     Cable Knee TKE     Supine Ball Rolls into Flexion   30x   SL progressive bridges   3x10              Prone Knee Flexion Stretch   4 x 30 sec hold with green strap         NK Table   Manual Therapy           Lat Stepdowns  6" 3 x 10    Eccentric Stepdowns  30x    Progressive LAQ with Band  [id number] x 10 sec hold                    Rayray received the following manual therapy techniques: Joint mobilizations, Myofacial release, and Soft tissue Mobilization were applied to the: Left " Knee for 10 minutes, including:  Graston with GT to left quad in prolonged stretch position  Manual Prone Quad  Progressive Flexion Bridges  NK chair for progressive creep stretch   Measurements Taken     Rayray participated in neuromuscular re-education activities to improve: Balance, Coordination, Kinesthetic, and Proprioception for 0 minutes. The following activities were included:  Single Leg Stance on Left               Home Exercises Provided and Patient Education Provided     Education provided: Reviewed his home exercise and stretching program and instructed him to stretch diligently at home    Written Home Exercises Provided: yes.  Exercises were reviewed and Rayray was able to demonstrate them prior to the end of the session.  Rayray demonstrated good  understanding of the education provided.     See EMR under Patient Instructions for exercises provided prior visit.    Assessment     Patient has received Physical Therapy for over 30 days and 8 visits. He works hard in Therapy and is showing some improvement with the Knee Flexion Range of Motion. However, the Knee remains very rigid and stiff. Left Knee is significantly larger than the Right due to extensive scar tissue. Patient's pain level remains about 6-7/10 most days. Feel he will definitely benefit from the use of the JOSHUA splint. Awaiting approval from MD to order this. Patient will continue to require Physical Therapy intervention to address all deficits. Sup Visit performed today with IZABEL Perez and IZABEL Jackson.  All goals and treatment plan reviewed. Will work toward completion of all goals set.         Range of Motion/Strength :   Left Extremity on 11/28/2022                                                           Left Extremity on 1/6/2023  AROM PROM Strength  Location  AROM    PROM   Strength     68   70  4/5   Knee    Flexion (140)  84  90  4+/5     0    4/5                Extension (0)   0     4+/5         PMH from the  Evaluation : Rayray is a 62 y.o. male referred to outpatient Physical Therapy with a medical diagnosis of Left Knee Pain. Rayray reports: He underwent Left TKA July 2019, Revision October 2020, and he has undergone 4 different Left Knee Manipulation and Scar tissue removal procedures. Last Manipulation was in 2021. He is here today due to continued Left Knee Pain and dysfunction. Left Knee presents with Flexion contracture and Left Knee significantly larger than the Right Knee. Patient presents with decreased Left Knee Flexion Range of Motion with hard end feel at end range. Left Knee is much larger than the Right most likely due to build up of scar tissue. Patient has difficulty with sit to stand especially from low chair. He shifts his weight to the Right in order to stand. Patient is noted to have decreased stance time and antalgic gait on the Left. Patient has decreased heel strike and hits foot flat with initial contact on the Left. He tends to keep the knee slightly flexed throughout gait cycle. Patient has decreased step/stride length resulting in slow michelle.  Patient will require Physical Therapy Intervention to address all deficits and work toward completion of all goals set. Therapist will refer patient back to MD upon completion of Therapy for further assessment if pain and dysfunction persist.          Rayray Is progressing well towards his goals.   Pt prognosis is Good.     Pt will continue to benefit from skilled outpatient physical therapy to address the deficits listed in the problem list box on initial evaluation, provide pt/family education and to maximize pt's level of independence in the home and community environment.     Pt's spiritual, cultural and educational needs considered and pt agreeable to plan of care and goals.     Anticipated Barriers for therapy: Long Standing Left Knee Flexion Contracture     Goals:  Short Term Goals: 3 weeks   1. Independent with Home Exercise Program - Goal  Ongoing   2. Increase Left Knee Range of Motion to 0 Degrees to 90 Degrees - Goal Partially Met (Passive Range of Motion is now 90 degrees with excessive overpressure)  3. Increase Left Knee Strength to grossly 4+/5 - Goal Met   4. Patient will ambulate 500 feet with Normal Gait pattern with complaints of pain Less than or Equal to 4/10. - Goal Not Met      Long Term Goals: 6 weeks   1. Patient will increase Left Knee Strength to grossly 5/5 . - Goal Not Met   2. Increase Left Knee Range of Motion to 0 Degrees to 100 Degrees. - Goal Not Met   3. Patient will ambulate 1000+ feet with Good Heel strike on the Left with complaints of pain Less than or Equal to 3/10. . - Goal Not Met       Plan     Updated Certification Period: 1/6/2023 to 2/5/2023  Recommended Treatment Plan: 2 times per week for 4 weeks: Electrical Stimulation to increase strength and decrease pain and inflammation as needed, Gait Training, Manual Therapy, Moist Heat/ Ice, Neuromuscular Re-ed, Patient Education, Therapeutic Activities, and Therapeutic Exercise       RADHA GARCIA, PT, DPT     1/6/2023

## 2023-01-06 NOTE — PLAN OF CARE
"  [facility]   Physical Therapy Updated Plan of Care      Name: Rayray Hutchins  Clinic Number: [id number]      Therapy Diagnosis: Left Knee Pain   Physician: Dylan, Provider    Visit Date: 1/6/2023     Physician Orders: PT Eval and Treat   Medical Diagnosis from Referral: Left Knee Pain   Evaluation Date: 11/28/2022  Updated Plan of Care Due : 2/5/2022  Authorization Period Expiration: 11/15/2023  Plan of Care Expiration: 2/5/2023  Visit # / Visits authorized: 8/16  PTA VISIT 1/5      Time In: 9:55 am    Time Out: 10:50 am  Total Billable Time: 55 minutes    Precautions: Standard  Functional Level at time of Evaluation:  Since the first knee surgery in 2019 he has had continued pain and dysfunction in the Left Knee     Subjective     Pt reports: He has been doing his exercises and stretches at home.  He was compliant with home exercise program.    Pain: 6-7/10  Location: left knee      Objective       Rayray received therapeutic exercises to develop strength, endurance, ROM, and flexibility for 45 minutes including:      Knee Exercises        NuStep  5 Min    Calf Stretch  5x20 seconds   Hamstring Stretch  5x20 seconds   Quad Stretch  8 x 15 sec    Cybex Leg Press - Bilateral  10 plates 3 x 10   Cybex Leg Press - Left Only   7 plates 3 x 10   Cybex Knee Extension  3 x 10 3 plates   Cybex Hamstring Curls  5 plates 3 x 10 SINGLE LEG   Cybex Hip - Abduction     Cybex Hip - Flexion      Cybex Hip - Extension     Cable Knee TKE     Supine Ball Rolls into Flexion   30x   SL progressive bridges   3x10              Prone Knee Flexion Stretch   4 x 30 sec hold with green strap         NK Table   Manual Therapy           Lat Stepdowns  6" 3 x 10    Eccentric Stepdowns  30x    Progressive LAQ with Band  [id number] x 10 sec hold                    Rayray received the following manual therapy techniques: Joint mobilizations, Myofacial release, and Soft tissue Mobilization were applied to " the: Left Knee for 10 minutes, including:  Graston with GT to left quad in prolonged stretch position  Manual Prone Quad  Progressive Flexion Bridges  NK chair for progressive creep stretch   Measurements Taken     Rayray participated in neuromuscular re-education activities to improve: Balance, Coordination, Kinesthetic, and Proprioception for 0 minutes. The following activities were included:  Single Leg Stance on Left               Home Exercises Provided and Patient Education Provided     Education provided: Reviewed his home exercise and stretching program and instructed him to stretch diligently at home    Written Home Exercises Provided: yes.  Exercises were reviewed and Rayray was able to demonstrate them prior to the end of the session.  Rayray demonstrated good  understanding of the education provided.     See EMR under Patient Instructions for exercises provided prior visit.    Assessment     Patient has received Physical Therapy for over 30 days and 8 visits. He works hard in Therapy and is showing some improvement with the Knee Flexion Range of Motion. However, the Knee remains very rigid and stiff. Left Knee is significantly larger than the Right due to extensive scar tissue. Patient's pain level remains about 6-7/10 most days. Feel he will definitely benefit from the use of the JOSHUA splint. Awaiting approval from MD to order this. Patient will continue to require Physical Therapy intervention to address all deficits. Sup Visit performed today with IZABEL Perez and IZABEL Jackson.  All goals and treatment plan reviewed. Will work toward completion of all goals set.   Updated Measurements are listed Below :       Range of Motion/Strength :   Left Extremity on 11/28/2022                                                           Left Extremity on 1/6/2023  AROM PROM Strength  Location  AROM    PROM   Strength     68   70  4/5   Knee    Flexion (140)  84  90  4+/5     0    4/5                 Extension (0)   0     4+/5             PMH from the Evaluation : Rayray is a 62 y.o. male referred to outpatient Physical Therapy with a medical diagnosis of Left Knee Pain. Rayray reports: He underwent Left TKA July 2019, Revision October 2020, and he has undergone 4 different Left Knee Manipulation and Scar tissue removal procedures. Last Manipulation was in 2021. He is here today due to continued Left Knee Pain and dysfunction. Left Knee presents with Flexion contracture and Left Knee significantly larger than the Right Knee. Patient presents with decreased Left Knee Flexion Range of Motion with hard end feel at end range. Left Knee is much larger than the Right most likely due to build up of scar tissue. Patient has difficulty with sit to stand especially from low chair. He shifts his weight to the Right in order to stand. Patient is noted to have decreased stance time and antalgic gait on the Left. Patient has decreased heel strike and hits foot flat with initial contact on the Left. He tends to keep the knee slightly flexed throughout gait cycle. Patient has decreased step/stride length resulting in slow michelle.  Patient will require Physical Therapy Intervention to address all deficits and work toward completion of all goals set. Therapist will refer patient back to MD upon completion of Therapy for further assessment if pain and dysfunction persist.          Rayray Is progressing well towards his goals.   Pt prognosis is Good.     Pt's spiritual, cultural and educational needs considered and pt agreeable to plan of care and goals.     Anticipated Barriers for therapy: Long Standing Left Knee Flexion Contracture     Goals:  Short Term Goals: 3 weeks   1. Independent with Home Exercise Program - Goal Ongoing   2. Increase Left Knee Range of Motion to 0 Degrees to 90 Degrees - Goal Partially Met (Passive Range of Motion is now 90 degrees with excessive overpressure)  3. Increase Left Knee Strength to grossly  4+/5 - Goal Met   4. Patient will ambulate 500 feet with Normal Gait pattern with complaints of pain Less than or Equal to 4/10. - Goal Not Met      Long Term Goals: 6 weeks   1. Patient will increase Left Knee Strength to grossly 5/5 . - Goal Not Met   2. Increase Left Knee Range of Motion to 0 Degrees to 100 Degrees. - Goal Not Met   3. Patient will ambulate 1000+ feet with Good Heel strike on the Left with complaints of pain Less than or Equal to 3/10. . - Goal Not Met     Reasons for Recertification of Therapy: Pt will continue to benefit from skilled outpatient physical therapy to address the deficits listed in the problem list box on initial evaluation, provide pt/family education and to maximize pt's level of independence in the home and community environment.     Plan     Updated Certification Period: 1/6/2023 to 2/5/2023  Recommended Treatment Plan: 2 times per week for 4 weeks: Electrical Stimulation to increase strength and decrease pain and inflammation as needed, Gait Training, Manual Therapy, Moist Heat/ Ice, Neuromuscular Re-ed, Patient Education, Therapeutic Activities, and Therapeutic Exercise    Other Recommendations: Request orders for JOSHUA splint for progressive Knee Flexion     RADHA GARCIA, PT, DPT   1/6/2023      I CERTIFY THE NEED FOR THESE SERVICES FURNISHED UNDER THIS PLAN OF TREATMENT AND WHILE UNDER MY CARE.    Physician's comments:      Physician's Signature: ___________________________________________________

## 2023-01-09 ENCOUNTER — CLINICAL SUPPORT (OUTPATIENT)
Dept: REHABILITATION | Facility: HOSPITAL | Age: 63
End: 2023-01-09
Payer: OTHER GOVERNMENT

## 2023-01-09 DIAGNOSIS — M25.662 DECREASED RANGE OF MOTION OF LEFT KNEE: Primary | ICD-10-CM

## 2023-01-09 DIAGNOSIS — R29.898 WEAKNESS OF LEFT LEG: ICD-10-CM

## 2023-01-09 DIAGNOSIS — M25.562 ACUTE PAIN OF LEFT KNEE: ICD-10-CM

## 2023-01-09 PROCEDURE — 97110 THERAPEUTIC EXERCISES: CPT | Mod: CQ

## 2023-01-09 NOTE — PROGRESS NOTES
"  [facility]   Physical Therapy Treatment Note     Name: Rayray Hutchins  Clinic Number: [id number]      Therapy Diagnosis: Left Knee Pain   Physician: Dylan, Provider    Visit Date: 1/9/2023     Physician Orders: PT Eval and Treat   Medical Diagnosis from Referral: Left Knee Pain   Evaluation Date: 11/28/2022  Updated Plan of Care Due : 2/5/2022  Authorization Period Expiration: 11/15/2023  Plan of Care Expiration: 2/5/2023  Visit # / Visits authorized: 9/16  PTA VISIT 1/5      Time In: 12:57 pm    Time Out: 1:38 pm  Total Billable Time: 41 minutes    Precautions: Standard  Functional Level at time of Evaluation:  Since the first knee surgery in 2019 he has had continued pain and dysfunction in the Left Knee     Subjective     Pt reports: He has been doing his exercises and stretches at home.  He was compliant with home exercise program.    Pain: 6-7/10  Location: left knee      Objective       Rayray received therapeutic exercises to develop strength, endurance, ROM, and flexibility for 41 minutes including:      Knee Exercises        NuStep  5 Min    Calf Stretch  5x20 seconds   Hamstring Stretch  5x20 seconds   Quad Stretch  8 x 15 sec    Cybex Leg Press - Bilateral  10 plates 3 x 10   Cybex Leg Press - Left Only   7 plates 3 x 10   Cybex Knee Extension  3 x 10 3 plates   Cybex Hamstring Curls  5 plates 3 x 10 SINGLE LEG   Cybex Hip - Abduction     Cybex Hip - Flexion      Cybex Hip - Extension     Cable Knee TKE  5 plates 30x   Supine Ball Rolls into Flexion   30x   SL progressive bridges   3x10   Wall Squats with Ball  30x         Prone Knee Flexion Stretch   4 x 30 sec hold with green strap         NK Table   Manual Therapy           Lat Stepdowns  6" 3 x 10    Eccentric Stepdowns  30x    Progressive LAQ with Band  [id number] x 10 sec hold                    Rayray received the following manual therapy techniques: Joint mobilizations, Myofacial release, and Soft tissue " Mobilization were applied to the: Left Knee for 0 minutes, including:  Graston with GT to left quad in prolonged stretch position  Manual Prone Quad  Progressive Flexion Bridges  NK chair for progressive creep stretch   Measurements Taken     Rayray participated in neuromuscular re-education activities to improve: Balance, Coordination, Kinesthetic, and Proprioception for 0 minutes. The following activities were included:  Single Leg Stance on Left               Home Exercises Provided and Patient Education Provided     Education provided: Reviewed his home exercise and stretching program and instructed him to stretch diligently at home    Written Home Exercises Provided: yes.  Exercises were reviewed and Rayray was able to demonstrate them prior to the end of the session.  Rayray demonstrated good  understanding of the education provided.     See EMR under Patient Instructions for exercises provided prior visit.    Assessment     Pt is progressing in his strength goal. ROM is relatively unchanged in flexion and holding neutral into extension. Progressed LE strengthening with fatigue noted. Pt still waiting on [facility] approval [place] JOSHUA splint. Will continue to progress as able,         Range of Motion/Strength :   Left Extremity on 11/28/2022                                                           Left Extremity on 1/6/2023  [facility] PROM Strength  Location  [facility]    PROM   Strength     68   70  4/5   Knee    Flexion (140)  84  90  4+/5     0    4/5                Extension (0)   0     4+/5         PMH from the Evaluation : Rayray is a 62 y.o. male referred to outpatient Physical Therapy with a medical diagnosis of Left Knee Pain. Rayray reports: He underwent Left TKA July 2019, Revision October 2020, and he has undergone 4 different Left Knee Manipulation and Scar tissue removal procedures. Last Manipulation was in 2021. He is here today due to continued Left Knee Pain and dysfunction. Left Knee presents with Flexion  contracture and Left Knee significantly larger than the Right Knee. Patient presents with decreased Left Knee Flexion Range of Motion with hard end feel at end range. Left Knee is much larger than the Right most likely due to build up of scar tissue. Patient has difficulty with sit to stand especially from low chair. He shifts his weight to the Right in order to stand. Patient is noted to have decreased stance time and antalgic gait on the Left. Patient has decreased heel strike and hits foot flat with initial contact on the Left. He tends to keep the knee slightly flexed throughout gait cycle. Patient has decreased step/stride length resulting in slow michelle.  Patient will require Physical Therapy Intervention to address all deficits and work toward completion of all goals set. Therapist will refer patient back to MD upon completion of Therapy for further assessment if pain and dysfunction persist.          Rayray Is progressing well towards his goals.   Pt prognosis is Good.     Pt will continue to benefit from skilled outpatient physical therapy to address the deficits listed in the problem list box on initial evaluation, provide pt/family education and to maximize pt's level of independence in the home and community environment.     Pt's spiritual, cultural and educational needs considered and pt agreeable to plan of care and goals.     Anticipated Barriers for therapy: Long Standing Left Knee Flexion Contracture     Goals:  Short Term Goals: 3 weeks   1. Independent with Home Exercise Program - Goal Ongoing   2. Increase Left Knee Range of Motion to 0 Degrees to 90 Degrees - Goal Partially Met (Passive Range of Motion is now 90 degrees with excessive overpressure)  3. Increase Left Knee Strength to grossly 4+/5 - Goal Met   4. Patient will ambulate 500 feet with Normal Gait pattern with complaints of pain Less than or Equal to 4/10. - Goal Not Met      Long Term Goals: 6 weeks   1. Patient will increase Left  Knee Strength to grossly 5/5 . - Goal Not Met   2. Increase Left Knee Range of Motion to 0 Degrees to 100 Degrees. - Goal Not Met   3. Patient will ambulate 1000+ feet with Good Heel strike on the Left with complaints of pain Less than or Equal to 3/10. . - Goal Not Met       Plan     Updated Certification Period: 1/6/2023 to 2/5/2023  Recommended Treatment Plan: 2 times per week for 4 weeks: Electrical Stimulation to increase strength and decrease pain and inflammation as needed, Gait Training, Manual Therapy, Moist Heat/ Ice, Neuromuscular Re-ed, Patient Education, Therapeutic Activities, and Therapeutic Exercise       Derrick Carr, PTA,     1/9/2023

## 2023-01-13 ENCOUNTER — CLINICAL SUPPORT (OUTPATIENT)
Dept: REHABILITATION | Facility: HOSPITAL | Age: 63
End: 2023-01-13
Payer: OTHER GOVERNMENT

## 2023-01-13 DIAGNOSIS — M25.662 DECREASED RANGE OF MOTION OF LEFT KNEE: Primary | ICD-10-CM

## 2023-01-13 DIAGNOSIS — R29.898 WEAKNESS OF LEFT LEG: ICD-10-CM

## 2023-01-13 DIAGNOSIS — M25.562 ACUTE PAIN OF LEFT KNEE: ICD-10-CM

## 2023-01-13 PROCEDURE — 97140 MANUAL THERAPY 1/> REGIONS: CPT

## 2023-01-13 PROCEDURE — 97110 THERAPEUTIC EXERCISES: CPT

## 2023-01-13 NOTE — PROGRESS NOTES
"  [facility]   Physical Therapy Treatment Note     Name: Rayray Hutchins  Clinic Number: [id number]      Therapy Diagnosis: Left Knee Pain   Physician: Dylan, Provider    Visit Date: 1/13/2023     Physician Orders: PT Eval and Treat   Medical Diagnosis from Referral: Left Knee Pain   Evaluation Date: 11/28/2022  Authorization Period Expiration: 11/15/2023  Plan of Care Expiration: 2/5/2023  Visit # / Visits authorized: 10/16  PTA VISIT 1/5      Time In: 9:55 am    Time Out: 10:50 am  Total Billable Time: 55 minutes    Precautions: Standard  Functional Level at time of Evaluation:  Since the first knee surgery in 2019 he has had continued pain and dysfunction in the Left Knee     Subjective     Pt reports: His pain is worse today. He thinks it may be due to the weather.  He was compliant with home exercise program.    Pain: 8/10  Location: left knee      Objective       Rayray received therapeutic exercises to develop strength, endurance, ROM, and flexibility for 40 minutes including:      Knee Exercises        NuStep  5 Min (Seat #5)   Calf Stretch  5x20 seconds   Hamstring Stretch  5x20 seconds   Quad Stretch  8 x 15 sec    Cybex Leg Press - Bilateral  10 plates 3 x 10   Cybex Leg Press - Left Only   7 plates 3 x 10   Cybex Knee Extension  3 x 10 3 plates   Cybex Hamstring Curls  5 plates 3 x 10 SINGLE LEG   Cybex Hip - Abduction     Cybex Hip - Flexion      Cybex Hip - Extension     Cable Knee TKE  5 plates 30x   Supine Ball Rolls into Flexion   30x   SL progressive bridges   3x10   Wall Squats with Ball  30x         Prone Knee Flexion Stretch   4 x 30 sec hold with green strap         NK Table   Manual Therapy           Lat Stepdowns  6" 3 x 10    Eccentric Stepdowns  30x    Progressive LAQ with Band  [id number] x 10 sec hold                    Rayray received the following manual therapy techniques: Joint mobilizations, Myofacial release, and Soft tissue Mobilization were " applied to the: Left Knee for 15 minutes, including:    Graston with GT to left quad in prolonged stretch position - NTV  Manual Prone Quad  Progressive Flexion Bridges  NK chair for progressive creep stretch - Knee Extension x 10 with 8.8 lbs followed by flexion stretch with over-pressure x 2 min; This was repeated 4 times with Measurements Taken after     Rayray participated in neuromuscular re-education activities to improve: Balance, Coordination, Kinesthetic, and Proprioception for 0 minutes. The following activities were included:  Single Leg Stance on Left               Home Exercises Provided and Patient Education Provided     Education provided: Reviewed his home exercise and stretching program and instructed him to stretch diligently at home    Written Home Exercises Provided: yes.  Exercises were reviewed and Rayray was able to demonstrate them prior to the end of the session.  Rayray demonstrated good  understanding of the education provided.     See EMR under Patient Instructions for exercises provided prior visit.    Assessment     Patient is showing small gains with Knee Range of Motion. He works hard in Therapy and is consistent with stretching at home, however, he has significant amount of scar tissue in the knee that makes him unable to flex past 90 degrees at this time. Therapist placed patient on NK chair for progressive creep stretch - Knee Extension x 10 with 8.8 lbs followed by flexion stretch with over-pressure x 2 min; This was repeated 4 times with Measurements Taken after. Max Range of Motion reached in Therapy to date is 90 degrees.   We will continue to work with patient to increase strength and Range of Motion as able. Patient is still waiting on [facility] approval for JOSHUA splint. Sup Visit performed today with IZABEL Perez and IZABEL Jackson.  All goals and treatment plan reviewed. Will work toward completion of all goals set.         Range of Motion/Strength :   Left Extremity on  11/28/2022                                                           Left Extremity on 1/13/2023  [facility] PROM Strength  Location  [facility]    PROM   Strength     68   70  4/5   Knee    Flexion (140)  84  90  4+/5     0    4/5                Extension (0)   0     4+/5             PMH from the Evaluation : Rayray is a 62 y.o. male referred to outpatient Physical Therapy with a medical diagnosis of Left Knee Pain. Rayrya reports: He underwent Left TKA July 2019, Revision October 2020, and he has undergone 4 different Left Knee Manipulation and Scar tissue removal procedures. Last Manipulation was in 2021. He is here today due to continued Left Knee Pain and dysfunction. Left Knee presents with Flexion contracture and Left Knee significantly larger than the Right Knee. Patient presents with decreased Left Knee Flexion Range of Motion with hard end feel at end range. Left Knee is much larger than the Right most likely due to build up of scar tissue. Patient has difficulty with sit to stand especially from low chair. He shifts his weight to the Right in order to stand. Patient is noted to have decreased stance time and antalgic gait on the Left. Patient has decreased heel strike and hits foot flat with initial contact on the Left. He tends to keep the knee slightly flexed throughout gait cycle. Patient has decreased step/stride length resulting in slow michelle.  Patient will require Physical Therapy Intervention to address all deficits and work toward completion of all goals set. Therapist will refer patient back to MD upon completion of Therapy for further assessment if pain and dysfunction persist.          Rayray Is progressing well towards his goals.   Pt prognosis is Good.     Pt will continue to benefit from skilled outpatient physical therapy to address the deficits listed in the problem list box on initial evaluation, provide pt/family education and to maximize pt's level of independence in the home and community  environment.     Pt's spiritual, cultural and educational needs considered and pt agreeable to plan of care and goals.     Anticipated Barriers for therapy: Long Standing Left Knee Flexion Contracture     Goals:  Short Term Goals: 3 weeks   1. Independent with Home Exercise Program - Goal Ongoing   2. Increase Left Knee Range of Motion to 0 Degrees to 90 Degrees - Goal Partially Met (Passive Range of Motion is now 90 degrees with excessive overpressure)  3. Increase Left Knee Strength to grossly 4+/5 - Goal Met   4. Patient will ambulate 500 feet with Normal Gait pattern with complaints of pain Less than or Equal to 4/10. - Goal Not Met      Long Term Goals: 6 weeks   1. Patient will increase Left Knee Strength to grossly 5/5 . - Goal Not Met   2. Increase Left Knee Range of Motion to 0 Degrees to 100 Degrees. - Goal Not Met   3. Patient will ambulate 1000+ feet with Good Heel strike on the Left with complaints of pain Less than or Equal to 3/10. . - Goal Not Met       Plan     Updated Certification Period: 1/6/2023 to 2/5/2023  Recommended Treatment Plan: 2 times per week for 4 weeks: Electrical Stimulation to increase strength and decrease pain and inflammation as needed, Gait Training, Manual Therapy, Moist Heat/ Ice, Neuromuscular Re-ed, Patient Education, Therapeutic Activities, and Therapeutic Exercise       RADHA GARCIA, PT, DPT     1/13/2023

## 2023-01-18 ENCOUNTER — CLINICAL SUPPORT (OUTPATIENT)
Dept: REHABILITATION | Facility: HOSPITAL | Age: 63
End: 2023-01-18
Payer: OTHER GOVERNMENT

## 2023-01-18 DIAGNOSIS — M25.562 ACUTE PAIN OF LEFT KNEE: ICD-10-CM

## 2023-01-18 DIAGNOSIS — R29.898 WEAKNESS OF LEFT LEG: ICD-10-CM

## 2023-01-18 DIAGNOSIS — M25.662 DECREASED RANGE OF MOTION OF LEFT KNEE: Primary | ICD-10-CM

## 2023-01-18 PROCEDURE — 97110 THERAPEUTIC EXERCISES: CPT

## 2023-01-18 PROCEDURE — 97140 MANUAL THERAPY 1/> REGIONS: CPT

## 2023-01-18 NOTE — PROGRESS NOTES
"  [facility]   Physical Therapy Treatment Note     Name: Rayray Hutchins  Clinic Number: [id number]      Therapy Diagnosis: Left Knee Pain   Physician: Dylan, Provider    Visit Date: 1/18/2023     Physician Orders: PT Eval and Treat   Medical Diagnosis from Referral: Left Knee Pain   Evaluation Date: 11/28/2022  Authorization Period Expiration: 11/15/2023  Plan of Care Expiration: 2/5/2023  Visit # / Visits authorized: 11/16  PTA VISIT 1/5      Time In: 9:51 am    Time Out: 10:50 am  Total Billable Time: 55 minutes    Precautions: Standard  Functional Level at time of Evaluation:  Since the first knee surgery in 2019 he has had continued pain and dysfunction in the Left Knee     Subjective     Pt reports: His knee is still an 8/10  He was compliant with home exercise program.    Pain: 8/10  Location: left knee      Objective       Rayray received therapeutic exercises to develop strength, endurance, ROM, and flexibility for 40 minutes including:      Knee Exercises        NuStep  5 Min (Seat #5)   Calf Stretch  5x20 seconds   Hamstring Stretch  5x20 seconds   Quad Stretch  8 x 15 sec    Cybex Leg Press - Bilateral  10 plates 3 x 10   Cybex Leg Press - Left Only   7 plates 3 x 10   Cybex Knee Extension  3 x 10 3 plates   Cybex Hamstring Curls  5 plates 3 x 10 SINGLE LEG   Cybex Hip - Abduction     Cybex Hip - Flexion      Cybex Hip - Extension     Cable Knee TKE  5 plates 30x   Supine Ball Rolls into Flexion   30x   SL progressive bridges   3x10   Wall Squats with Ball  30x         Prone Knee Flexion Stretch   4 x 30 sec hold with green strap         NK Table   Manual Therapy           Lat Stepdowns  6" 3 x 10    Eccentric Stepdowns  30x    Progressive LAQ with Band  [id number] x 10 sec hold                    Rayray received the following manual therapy techniques: Joint mobilizations, Myofacial release, and Soft tissue Mobilization were applied to the: Left Knee for 15 minutes, " including:    Graston with GT to left quad in prolonged stretch position - NTV  Manual Prone Quad  Progressive Flexion Bridges  NK chair for progressive creep stretch - Knee Extension x 10 with 8.8 lbs followed by flexion stretch with over-pressure x 2 min; This was repeated 4 times with Measurements Taken after     Rayray participated in neuromuscular re-education activities to improve: Balance, Coordination, Kinesthetic, and Proprioception for 0 minutes. The following activities were included:  Single Leg Stance on Left               Home Exercises Provided and Patient Education Provided     Education provided: Reviewed his home exercise and stretching program and instructed him to stretch diligently at home    Written Home Exercises Provided: yes.  Exercises were reviewed and Rayray was able to demonstrate them prior to the end of the session.  Rayray demonstrated good  understanding of the education provided.     See EMR under Patient Instructions for exercises provided prior visit.    Assessment     Patient's knee pain has been 8/10 for the past week. He works very hard in Therapy and tolerates the aggressive stretching well. He is continuing to show  small gains with Knee Range of Motion. He is consistent with stretching at home, however, he has significant amount of scar tissue in the knee that makes him unable to flex past 90 degrees at this time. Therapist placed patient on NK chair for progressive creep stretch - Knee Extension x 10 with 8.8 lbs followed by flexion stretch with over-pressure x 2 min; This was repeated 4 times with Measurements Taken after. Max Range of Motion reached in Therapy to date is 90 degrees.            Range of Motion/Strength :   Left Extremity on 11/28/2022                                                           Left Extremity on 1/13/2023  [facility] PROM Strength  Location  [facility]    PROM   Strength     68   70  4/5   Knee    Flexion (140)  84  90  4+/5     0    4/5                 Extension (0)   0     4+/5             PMH from the Evaluation : Rayray is a 62 y.o. male referred to outpatient Physical Therapy with a medical diagnosis of Left Knee Pain. Rayray reports: He underwent Left TKA July 2019, Revision October 2020, and he has undergone 4 different Left Knee Manipulation and Scar tissue removal procedures. Last Manipulation was in 2021. He is here today due to continued Left Knee Pain and dysfunction. Left Knee presents with Flexion contracture and Left Knee significantly larger than the Right Knee. Patient presents with decreased Left Knee Flexion Range of Motion with hard end feel at end range. Left Knee is much larger than the Right most likely due to build up of scar tissue. Patient has difficulty with sit to stand especially from low chair. He shifts his weight to the Right in order to stand. Patient is noted to have decreased stance time and antalgic gait on the Left. Patient has decreased heel strike and hits foot flat with initial contact on the Left. He tends to keep the knee slightly flexed throughout gait cycle. Patient has decreased step/stride length resulting in slow mcihelle.  Patient will require Physical Therapy Intervention to address all deficits and work toward completion of all goals set. Therapist will refer patient back to MD upon completion of Therapy for further assessment if pain and dysfunction persist.          Rayray Is progressing well towards his goals.   Pt prognosis is Good.     Pt will continue to benefit from skilled outpatient physical therapy to address the deficits listed in the problem list box on initial evaluation, provide pt/family education and to maximize pt's level of independence in the home and community environment.     Pt's spiritual, cultural and educational needs considered and pt agreeable to plan of care and goals.     Anticipated Barriers for therapy: Long Standing Left Knee Flexion Contracture     Goals:  Short Term Goals: 3 weeks    1. Independent with Home Exercise Program - Goal Ongoing   2. Increase Left Knee Range of Motion to 0 Degrees to 90 Degrees - Goal Partially Met (Passive Range of Motion is now 90 degrees with excessive overpressure)  3. Increase Left Knee Strength to grossly 4+/5 - Goal Met   4. Patient will ambulate 500 feet with Normal Gait pattern with complaints of pain Less than or Equal to 4/10. - Goal Not Met      Long Term Goals: 6 weeks   1. Patient will increase Left Knee Strength to grossly 5/5 . - Goal Not Met   2. Increase Left Knee Range of Motion to 0 Degrees to 100 Degrees. - Goal Not Met   3. Patient will ambulate 1000+ feet with Good Heel strike on the Left with complaints of pain Less than or Equal to 3/10. . - Goal Not Met       Plan     Updated Certification Period: 1/6/2023 to 2/5/2023  Recommended Treatment Plan: 2 times per week for 4 weeks: Electrical Stimulation to increase strength and decrease pain and inflammation as needed, Gait Training, Manual Therapy, Moist Heat/ Ice, Neuromuscular Re-ed, Patient Education, Therapeutic Activities, and Therapeutic Exercise       RADHA GARCIA, PT, DPT     1/18/2023

## 2023-01-23 ENCOUNTER — CLINICAL SUPPORT (OUTPATIENT)
Dept: REHABILITATION | Facility: HOSPITAL | Age: 63
End: 2023-01-23
Payer: OTHER GOVERNMENT

## 2023-01-23 DIAGNOSIS — M25.662 DECREASED RANGE OF MOTION OF LEFT KNEE: Primary | ICD-10-CM

## 2023-01-23 DIAGNOSIS — R29.898 WEAKNESS OF LEFT LEG: ICD-10-CM

## 2023-01-23 DIAGNOSIS — M25.562 ACUTE PAIN OF LEFT KNEE: ICD-10-CM

## 2023-01-23 PROCEDURE — 97110 THERAPEUTIC EXERCISES: CPT | Mod: CQ

## 2023-01-23 PROCEDURE — 97530 THERAPEUTIC ACTIVITIES: CPT | Mod: CQ

## 2023-01-23 NOTE — PROGRESS NOTES
[facility]   Physical Therapy Treatment Note     Name: Rayray Hutchins  Clinic Number: [id number]      Therapy Diagnosis: Left Knee Pain   Physician: Dylan, Provider    Visit Date: 1/23/2023     Physician Orders: PT Eval and Treat   Medical Diagnosis from Referral: Left Knee Pain   Evaluation Date: 11/28/2022  Authorization Period Expiration: 11/15/2023  Plan of Care Expiration: 2/5/2023  Visit # / Visits authorized: 11/16  PTA VISIT 1/5      Time In: 12:58 pm    Time Out: 1:44 pm  Total Billable Time: 46 minutes    Precautions: Standard  Functional Level at time of Evaluation:  Since the first knee surgery in 2019 he has had continued pain and dysfunction in the Left Knee     Subjective     Pt reports: pain at 8/10  He was compliant with home exercise program.    Pain: 8/10  Location: left knee      Objective       Rayray received therapeutic exercises to develop strength, endurance, ROM, and flexibility for 30 minutes including:      Knee Exercises        Bike  5 Min (Seat #5)   Calf Stretch  5x20 seconds   Hamstring Stretch  5x20 seconds   Quad Stretch  8 x 15 sec    Cybex Leg Press - Bilateral  10 plates 3 x 10   Cybex Leg Press - Left Only   7 plates 3 x 10   Cybex Knee Extension  3 x 10 3 plates   Cybex Hamstring Curls  5 plates 3 x 10 SINGLE LEG   Cybex Hip - Abduction  3 plates 20x   Cybex Hip - Flexion   3 plates 20x   Cybex Hip - Extension     Cable Knee TKE  5 plates 30x   Supine Ball Rolls into Flexion   30x   SL progressive bridges   3x10   Wall Squats with Ball  30x         Prone Knee Flexion Stretch   4 x 30 sec hold with green strap         NK Table   Manual Therapy                                    THERAPEUTIC ACTIVITIES x 10 Minutes  Fwd Steps x 25  Lateral Step x 25      Rayray received the following manual therapy techniques: Joint mobilizations, Myofacial release, and Soft tissue Mobilization were applied to the: Left Knee for 15 minutes,  including:    Graston with GT to left quad in prolonged stretch position - NTV  Manual Prone Quad  Progressive Flexion Bridges  NK chair for progressive creep stretch - Knee Extension x 10 with 8.8 lbs followed by flexion stretch with over-pressure x 2 min; This was repeated 4 times with Measurements Taken after     Rayray participated in neuromuscular re-education activities to improve: Balance, Coordination, Kinesthetic, and Proprioception for 0 minutes. The following activities were included:  Single Leg Stance on Left               Home Exercises Provided and Patient Education Provided     Education provided: Reviewed his home exercise and stretching program and instructed him to stretch diligently at home    Written Home Exercises Provided: yes.  Exercises were reviewed and Rayray was able to demonstrate them prior to the end of the session.  Rayray demonstrated good  understanding of the education provided.     See EMR under Patient Instructions for exercises provided prior visit.    Assessment     Pt tolerated all therapeutic exercises and activities today with mild increased pain noted. Pt is still very limited in flexion RANGE OF MOTION but extension RANGE OF MOTION is holding at neutral. Progressed LOWER EXTREMITY strengthening with muscular fatigue noted. Pt is getting [facility] by [facility] and hopefully will get the paperwork sent in for JOSHUA Splint.         Range of Motion/Strength :   Left Extremity on 11/28/2022                                                           Left Extremity on 1/13/2023  [facility] PROM Strength  Location  [facility]    PROM   Strength     68   70  4/5   Knee    Flexion (140)  84  90  4+/5     0    4/5                Extension (0)   0     4+/5             PMH from the Evaluation : Rayray is a 62 y.o. male referred to outpatient Physical Therapy with a medical diagnosis of Left Knee Pain. Rayray reports: He underwent Left TKA July 2019, Revision October 2020, and he has undergone 4  different Left Knee Manipulation and Scar tissue removal procedures. Last Manipulation was in 2021. He is here today due to continued Left Knee Pain and dysfunction. Left Knee presents with Flexion contracture and Left Knee significantly larger than the Right Knee. Patient presents with decreased Left Knee Flexion Range of Motion with hard end feel at end range. Left Knee is much larger than the Right most likely due to build up of scar tissue. Patient has difficulty with sit to stand especially from low chair. He shifts his weight to the Right in order to stand. Patient is noted to have decreased stance time and antalgic gait on the Left. Patient has decreased heel strike and hits foot flat with initial contact on the Left. He tends to keep the knee slightly flexed throughout gait cycle. Patient has decreased step/stride length resulting in slow michelle.  Patient will require Physical Therapy Intervention to address all deficits and work toward completion of all goals set. Therapist will refer patient back to MD upon completion of Therapy for further assessment if pain and dysfunction persist.          Rayray Is progressing well towards his goals.   Pt prognosis is Good.     Pt will continue to benefit from skilled outpatient physical therapy to address the deficits listed in the problem list box on initial evaluation, provide pt/family education and to maximize pt's level of independence in the home and community environment.     Pt's spiritual, cultural and educational needs considered and pt agreeable to plan of care and goals.     Anticipated Barriers for therapy: Long Standing Left Knee Flexion Contracture     Goals:  Short Term Goals: 3 weeks   1. Independent with Home Exercise Program - Goal Ongoing   2. Increase Left Knee Range of Motion to 0 Degrees to 90 Degrees - Goal Partially Met (Passive Range of Motion is now 90 degrees with excessive overpressure)  3. Increase Left Knee Strength to grossly 4+/5 -  Goal Met   4. Patient will ambulate 500 feet with Normal Gait pattern with complaints of pain Less than or Equal to 4/10. - Goal Not Met      Long Term Goals: 6 weeks   1. Patient will increase Left Knee Strength to grossly 5/5 . - Goal Not Met   2. Increase Left Knee Range of Motion to 0 Degrees to 100 Degrees. - Goal Not Met   3. Patient will ambulate 1000+ feet with Good Heel strike on the Left with complaints of pain Less than or Equal to 3/10. . - Goal Not Met       Plan     Updated Certification Period: 1/6/2023 to 2/5/2023  Recommended Treatment Plan: 2 times per week for 4 weeks: Electrical Stimulation to increase strength and decrease pain and inflammation as needed, Gait Training, Manual Therapy, Moist Heat/ Ice, Neuromuscular Re-ed, Patient Education, Therapeutic Activities, and Therapeutic Exercise       Derrick Carr, PTA    1/23/2023

## 2023-01-27 ENCOUNTER — CLINICAL SUPPORT (OUTPATIENT)
Dept: REHABILITATION | Facility: HOSPITAL | Age: 63
End: 2023-01-27
Payer: OTHER GOVERNMENT

## 2023-01-27 DIAGNOSIS — M25.662 DECREASED RANGE OF MOTION OF LEFT KNEE: Primary | ICD-10-CM

## 2023-01-27 DIAGNOSIS — R29.898 WEAKNESS OF LEFT LEG: ICD-10-CM

## 2023-01-27 DIAGNOSIS — M25.562 ACUTE PAIN OF LEFT KNEE: ICD-10-CM

## 2023-01-27 PROCEDURE — 97530 THERAPEUTIC ACTIVITIES: CPT | Mod: CQ

## 2023-01-27 PROCEDURE — 97140 MANUAL THERAPY 1/> REGIONS: CPT | Mod: CQ

## 2023-01-27 PROCEDURE — 97110 THERAPEUTIC EXERCISES: CPT | Mod: CQ

## 2023-01-27 NOTE — PROGRESS NOTES
[facility]   Physical Therapy Treatment Note     Name: Rayray Hutchins  Clinic Number: [id number]  Therapy Diagnosis: Left Knee Pain   Physician: Dylan, Provider  Visit Date: 1/27/2023     Physician Orders: PT Eval and Treat   Medical Diagnosis from Referral: Left Knee Pain   Evaluation Date: 11/28/2022  Authorization Period Expiration: 11/15/2023  Plan of Care Expiration: 2/5/2023  Visit # / Visits authorized: 13/16  PTA VISIT 2/5    Time In: 09:55 am  Time Out: 10:41 am  Total Billable Time: 46  minutes    Precautions: Standard  Functional Level at time of Evaluation:  Since the first knee surgery in 2019 he has had continued pain and dysfunction in the Left Knee     Subjective     Pt reports: knee is pretty stiff today. [facility] to [facility] with JOSHUA splint. pain at 8/10. Reports the weather has made it a little stiffer today.   He was compliant with home exercise program.    Pain: 8/10  Location: left knee      Objective     Rayray received therapeutic exercises to develop strength, endurance, ROM, and flexibility for 20 minutes including:    Knee Exercises     Bike  5 Min (Seat #5)   Calf Stretch  5x20 seconds   Hamstring Stretch  5x20 seconds   Quad Stretch  8 x 15 sec    Cybex Leg Press - Bilateral  10 plates 3 x 10   Cybex Leg Press - Left Only   7 plates 3 x 10   Cybex Knee Extension  3 x 10 3 plates   Cybex Hamstring Curls  5 plates 3 x 10 SINGLE LEG   Cybex Hip - Abduction bilateral   3 plates 20x   Cybex Hip - Flexion bilateral   3 plates 20x   Cybex Hip - Extension     Cable Knee TKE  5 plates 30x   Supine Ball Rolls into Flexion   30x (not performed today)    SL progressive bridges   3x10 (not performed today)    Wall Squats with Ball  20x         Prone Knee Flexion Stretch   4 x 30 sec hold with green strap (not performed today)          NK Table   Manual Therapy                                    Rayray received the following THERAPEUTIC  ACTIVITIES to improve functional performance for 10 Minutes  Fwd Steps x 30  Lateral Step x 30    Rayray received the following manual therapy techniques: Joint mobilizations, Myofacial release, and Soft tissue Mobilization were applied to the: Left Knee for 15 minutes, including:    Graston with GT to left quad in prolonged stretch position - NTV  Manual Prone Quad  Progressive Flexion Bridges  NK chair for progressive creep stretch - Knee Extension x 10 with 8.8 lbs followed by flexion stretch with over-pressure x 2 min; This was repeated 4 times with Measurements Taken after     Rayray participated in neuromuscular re-education activities to improve: Balance, Coordination, Kinesthetic, and Proprioception for 0 minutes. The following activities were included:  Single Leg Stance on Left     Home Exercises Provided and Patient Education Provided     Education provided: Reviewed his home exercise and stretching program and instructed him to stretch diligently at home    Written Home Exercises Provided: yes.  Exercises were reviewed and Rayray was able to demonstrate them prior to the end of the session.  Rayray demonstrated good  understanding of the education provided.     See EMR under Patient Instructions for exercises provided prior visit.    Assessment     Pt arrived with stiffness today in the left knee possible reports from the weather today, but eased up as treatment got going today. He reports [facility] is [facility] get [facility] to ortho MD on JOSHUA splint. Patient tolerated all therapeutic exercises and activities today with mild increased pain noted. Pt is still very limited in flexion RANGE OF MOTION but extension RANGE OF MOTION is holding at neutral. Progressed LOWER EXTREMITY strengthening with muscular fatigue noted. Pt is getting assigned [facility] by [facility] and hopefully will get the paperwork sent in for JOSHUA Splint.       Range of Motion/Strength :   Left Extremity on  11/28/2022                                                           Left Extremity on 1/272023  [facility] PROM Strength  Location  [facility]    PROM   Strength     68   70  4/5   Knee    Flexion (140)  86  93  4+/5     0    4/5                Extension (0)   0     4+/5         PMH from the Evaluation : Rayray is a 62 y.o. male referred to outpatient Physical Therapy with a medical diagnosis of Left Knee Pain. Rayray reports: He underwent Left TKA July 2019, Revision October 2020, and he has undergone 4 different Left Knee Manipulation and Scar tissue removal procedures. Last Manipulation was in 2021. He is here today due to continued Left Knee Pain and dysfunction. Left Knee presents with Flexion contracture and Left Knee significantly larger than the Right Knee. Patient presents with decreased Left Knee Flexion Range of Motion with hard end feel at end range. Left Knee is much larger than the Right most likely due to build up of scar tissue. Patient has difficulty with sit to stand especially from low chair. He shifts his weight to the Right in order to stand. Patient is noted to have decreased stance time and antalgic gait on the Left. Patient has decreased heel strike and hits foot flat with initial contact on the Left. He tends to keep the knee slightly flexed throughout gait cycle. Patient has decreased step/stride length resulting in slow michelle.  Patient will require Physical Therapy Intervention to address all deficits and work toward completion of all goals set. Therapist will refer patient back to MD upon completion of Therapy for further assessment if pain and dysfunction persist.          Rayray Is progressing well towards his goals.   Pt prognosis is Good.     Pt will continue to benefit from skilled outpatient physical therapy to address the deficits listed in the problem list box on initial evaluation, provide pt/family education and to maximize pt's level of independence in the home and community  environment.     Pt's spiritual, cultural and educational needs considered and pt agreeable to plan of care and goals.     Anticipated Barriers for therapy: Long Standing Left Knee Flexion Contracture     Goals:  Short Term Goals: 3 weeks   1. Independent with Home Exercise Program - Goal Ongoing   2. Increase Left Knee Range of Motion to 0 Degrees to 90 Degrees - Goal Partially Met (Passive Range of Motion is now 90 degrees with excessive overpressure)  3. Increase Left Knee Strength to grossly 4+/5 - Goal Met   4. Patient will ambulate 500 feet with Normal Gait pattern with complaints of pain Less than or Equal to 4/10. - Goal Not Met      Long Term Goals: 6 weeks   1. Patient will increase Left Knee Strength to grossly 5/5 . - Goal Not Met   2. Increase Left Knee Range of Motion to 0 Degrees to 100 Degrees. - Goal Not Met   3. Patient will ambulate 1000+ feet with Good Heel strike on the Left with complaints of pain Less than or Equal to 3/10. . - Goal Not Met       Plan     Updated Certification Period: 1/6/2023 to 2/5/2023  Recommended Treatment Plan: 2 times per week for 4 weeks: Electrical Stimulation to increase strength and decrease pain and inflammation as needed, Gait Training, Manual Therapy, Moist Heat/ Ice, Neuromuscular Re-ed, Patient Education, Therapeutic Activities, and Therapeutic Exercise       Lencho Forrester, PTA    1/27/2023

## 2023-01-30 ENCOUNTER — CLINICAL SUPPORT (OUTPATIENT)
Dept: REHABILITATION | Facility: HOSPITAL | Age: 63
End: 2023-01-30
Payer: OTHER GOVERNMENT

## 2023-01-30 DIAGNOSIS — R29.898 WEAKNESS OF LEFT LEG: ICD-10-CM

## 2023-01-30 DIAGNOSIS — M25.562 ACUTE PAIN OF LEFT KNEE: ICD-10-CM

## 2023-01-30 DIAGNOSIS — M25.662 DECREASED RANGE OF MOTION OF LEFT KNEE: Primary | ICD-10-CM

## 2023-01-30 PROCEDURE — 97110 THERAPEUTIC EXERCISES: CPT

## 2023-01-30 PROCEDURE — 97530 THERAPEUTIC ACTIVITIES: CPT

## 2023-01-30 PROCEDURE — 97140 MANUAL THERAPY 1/> REGIONS: CPT

## 2023-01-30 NOTE — PLAN OF CARE
[facility]   Physical Therapy Updated Plan of Care      Name: Rayray Hutchins  Clinic Number: [id number]  Therapy Diagnosis: Left Knee Pain   Physician: Dr Ilir Zimmerman   [phone] Fax  Visit Date: 1/30/2023     Physician Orders: PT Eval and Treat   Medical Diagnosis from Referral: Left Knee Pain   Evaluation Date: 11/28/2022  Authorization Period Expiration: 11/15/2023  Plan of Care Expiration: 3/17/2023  Visit # / Visits authorized: 14/16  PTA VISIT 0/5    Time In: 1:00 pm  Time Out: 1:58 pm  Total Billable Time: 55  minutes    Precautions: Standard  Functional Level at time of Evaluation:  Since the first knee surgery in 2019 he has had continued pain and dysfunction in the Left Knee     Subjective     Pt reports: [facility] to [facility] with JOSHAU splint. pain at 8/10.   He was compliant with home exercise program.    Pain: 8/10 upon arrival to Therapy; 9/10 with Stretches  Location: left knee      Objective     Rayray received therapeutic exercises to develop strength, endurance, ROM, and flexibility for 30 minutes including:    Knee Exercises     Bike  5 Min (Seat #5)   Calf Stretch  5x20 seconds   Hamstring Stretch  5x20 seconds   Quad Stretch  8 x 15 sec    Cybex Leg Press - Bilateral  10 plates 3 x 10   Cybex Leg Press - Left Only   7 plates 3 x 10   Cybex Knee Extension  3 x 10 3 plates   Cybex Hamstring Curls  5 plates 3 x 10 SINGLE LEG   Cybex Hip - Abduction bilateral   3 plates 20x   Cybex Hip - Flexion bilateral   3 plates 20x   Cybex Hip - Extension     Cable Knee TKE  5 plates 30x   Supine Ball Rolls into Flexion   30x (not performed today)    SL progressive bridges   3x10 (not performed today)    Wall Squats with Ball  20x         Prone Knee Flexion Stretch   4 x 30 sec hold with green strap (not performed today)          NK Table   Manual Therapy                                    Rayray received the following THERAPEUTIC ACTIVITIES to  improve functional performance for 10 Minutes  Fwd Steps x 30  Lateral Step x 30    Rayray received the following manual therapy techniques: Joint mobilizations, Myofacial release, and Soft tissue Mobilization were applied to the: Left Knee for 15 minutes, including:    Graston with GT to left quad in prolonged stretch position - NTV  Manual Prone Quad  Progressive Flexion Bridges  NK chair for progressive creep stretch - Knee Extension x 10 with 8.8 lbs followed by flexion stretch with over-pressure x 2 min; This was repeated 4 times with Measurements Taken after     Rayray participated in neuromuscular re-education activities to improve: Balance, Coordination, Kinesthetic, and Proprioception for 0 minutes. The following activities were included:  Single Leg Stance on Left     Home Exercises Provided and Patient Education Provided     Education provided: Reviewed his home exercise and stretching program and instructed him to stretch diligently at home    Written Home Exercises Provided: yes.  Exercises were reviewed and Rayray was able to demonstrate them prior to the end of the session.  Rayray demonstrated good  understanding of the education provided.     See EMR under Patient Instructions for exercises provided prior visit.    Assessment     Patient is showing slow progress with Therapy. Scar tissue is very thick surrounding the TKA site. He works hard in Therapy and is stretching at home. Therapist is performing aggressive Range of Motion with METs and Weighted Progressive Creep stretch. He reports he is [facility] to get [facility] to ortho MD on JOSHUA splint. Pt is still very limited in flexion RANGE OF MOTION but extension RANGE OF MOTION is holding at neutral. Progressed LOWER EXTREMITY strengthening with muscular fatigue noted. Pt is getting [facility] by [facility] and hopefully will get the paperwork sent in for JOSHUA Splint. Therapist is requesting additional visits in  order to increase Range of Motion to a more functional range and to work with patient when he finally receives the OJ[facility]UA splint. Updated Measurements are listed below:       Range of Motion/Strength :   Left Extremity on 11/28/2022                                                           Left Extremity on 1/302023  [facility] PROM Strength  Location  [facility]    PROM   Strength     68   70  4/5   Knee    Flexion (140)  86  93  4+/5     0    4/5                Extension (0)   0     4+/5         PMH from the Evaluation : Rayray is a 62 y.o. male referred to outpatient Physical Therapy with a medical diagnosis of Left Knee Pain. Rayray reports: He underwent Left TKA July 2019, Revision October 2020, and he has undergone 4 different Left Knee Manipulation and Scar tissue removal procedures. Last Manipulation was in 2021. He is here today due to continued Left Knee Pain and dysfunction. Left Knee presents with Flexion contracture and Left Knee significantly larger than the Right Knee. Patient presents with decreased Left Knee Flexion Range of Motion with hard end feel at end range. Left Knee is much larger than the Right most likely due to build up of scar tissue. Patient has difficulty with sit to stand especially from low chair. He shifts his weight to the Right in order to stand. Patient is noted to have decreased stance time and antalgic gait on the Left. Patient has decreased heel strike and hits foot flat with initial contact on the Left. He tends to keep the knee slightly flexed throughout gait cycle. Patient has decreased step/stride length resulting in slow michelle.  Patient will require Physical Therapy Intervention to address all deficits and work toward completion of all goals set. Therapist will refer patient back to MD upon completion of Therapy for further assessment if pain and dysfunction persist.          Rayray Is progressing well towards his goals.   Pt prognosis is Good.     Pt's spiritual, cultural and  educational needs considered and pt agreeable to plan of care and goals.     Anticipated Barriers for therapy: Long Standing Left Knee Flexion Contracture     Goals:  Short Term Goals: 3 weeks   1. Independent with Home Exercise Program - Goal Ongoing   2. Increase Left Knee Range of Motion to 0 Degrees to 90 Degrees - Goal Partially Met (Passive Range of Motion is now 90 degrees with excessive overpressure)  3. Increase Left Knee Strength to grossly 4+/5 - Goal Met   4. Patient will ambulate 500 feet with Normal Gait pattern with complaints of pain Less than or Equal to 4/10. - Goal Not Met      Long Term Goals: 6 weeks   1. Patient will increase Left Knee Strength to grossly 5/5 . - Goal Not Met   2. Increase Left Knee Range of Motion to 0 Degrees to 100 Degrees. - Goal Not Met   3. Patient will ambulate 1000+ feet with Good Heel strike on the Left with complaints of pain Less than or Equal to 3/10. . - Goal Not Met     Reasons for Recertification of Therapy: Pt will continue to benefit from skilled outpatient physical therapy to address the deficits listed in the problem list box on initial evaluation, provide pt/family education and to maximize pt's level of independence in the home and community environment.     Plan     Updated Certification Period: 1/30/2023 to 3/17/2023  Recommended Treatment Plan: 2 times per week for 6 weeks: Electrical Stimulation to increase strength and decrease pain and inflammation as needed, Gait Training, Manual Therapy, Moist Heat/ Ice, Neuromuscular Re-ed, Patient Education, Therapeutic Activities, and Therapeutic Exercise      RADHA GARCIA, PT, DPT   1/30/2023      I CERTIFY THE NEED FOR THESE SERVICES FURNISHED UNDER THIS PLAN OF TREATMENT AND WHILE UNDER MY CARE.    Physician's comments:      Physician's Signature: ___________________________________________________

## 2023-01-30 NOTE — PROGRESS NOTES
[facility]   Physical Therapy Treatment Note     Name: Rayray Hutchins  Clinic Number: [id number]  Therapy Diagnosis: Left Knee Pain   Physician: Dr Ilir Zimmerman   [phone] Fax  Visit Date: 1/30/2023     Physician Orders: PT Eval and Treat   Medical Diagnosis from Referral: Left Knee Pain   Evaluation Date: 11/28/2022  Authorization Period Expiration: 11/15/2023  Plan of Care Expiration: 3/17/2023  Visit # / Visits authorized: 14/16  PTA VISIT 0/5    Time In: 1:00 pm  Time Out: 1:58 pm  Total Billable Time: 55  minutes    Precautions: Standard  Functional Level at time of Evaluation:  Since the first knee surgery in 2019 he has had continued pain and dysfunction in the Left Knee     Subjective     Pt reports: [facility] JOSHUA splint. pain at 8/10.   He was compliant with home exercise program.    Pain: 8/10 upon arrival to Therapy; 9/10 with Stretches  Location: left knee      Objective     Rayray received therapeutic exercises to develop strength, endurance, ROM, and flexibility for 30 minutes including:    Knee Exercises     Bike  5 Min (Seat #5)   Calf Stretch  5x20 seconds   Hamstring Stretch  5x20 seconds   Quad Stretch  8 x 15 sec    Cybex Leg Press - Bilateral  10 plates 3 x 10   Cybex Leg Press - Left Only   7 plates 3 x 10   Cybex Knee Extension  3 x 10 3 plates   Cybex Hamstring Curls  5 plates 3 x 10 SINGLE LEG   Cybex Hip - Abduction bilateral   3 plates 20x   Cybex Hip - Flexion bilateral   3 plates 20x   Cybex Hip - Extension     Cable Knee TKE  5 plates 30x   Supine Ball Rolls into Flexion   30x (not performed today)    SL progressive bridges   3x10 (not performed today)    Wall Squats with Ball  20x         Prone Knee Flexion Stretch   4 x 30 sec hold with green strap (not performed today)          NK Table   Manual Therapy                                    Rayray received the following THERAPEUTIC ACTIVITIES to improve  functional performance for 10 Minutes  Fwd Steps x 30  Lateral Step x 30    Rayray received the following manual therapy techniques: Joint mobilizations, Myofacial release, and Soft tissue Mobilization were applied to the: Left Knee for 15 minutes, including:    Graston with GT to left quad in prolonged stretch position - NTV  Manual Prone Quad  Progressive Flexion Bridges  NK chair for progressive creep stretch - Knee Extension x 10 with 8.8 lbs followed by flexion stretch with over-pressure x 2 min; This was repeated 4 times with Measurements Taken after     Rayray participated in neuromuscular re-education activities to improve: Balance, Coordination, Kinesthetic, and Proprioception for 0 minutes. The following activities were included:  Single Leg Stance on Left     Home Exercises Provided and Patient Education Provided     Education provided: Reviewed his home exercise and stretching program and instructed him to stretch diligently at home    Written Home Exercises Provided: yes.  Exercises were reviewed and Rayray was able to demonstrate them prior to the end of the session.  Rayray demonstrated good  understanding of the education provided.     See EMR under Patient Instructions for exercises provided prior visit.    Assessment     Patient is showing slow progress with Therapy. Scar tissue is very thick surrounding the TKA site. He works hard in Therapy and is stretching at home. Therapist is performing aggressive Range of Motion with METs and Weighted Progressive Creep stretch. He reports he is [facility] to get [facility] to [facility]o MD on JOSHUA splint. Pt is still very limited in flexion RANGE OF MOTION but extension RANGE OF MOTION is holding at neutral. Progressed LOWER EXTREMITY strengthening with muscular fatigue noted. Pt is getting assigned new [facility] by [facility] and hopefully will get the paperwork sent in for JOSHUA Splint. Therapist is requesting additional visits in order to  increase Range of Motion to a more functional range and to work with patient when he finally receives the JOSHUA splint. Updated Measurements are listed below:       Range of Motion/Strength :   Left Extremity on 11/28/2022                                                           Left Extremity on 1/302023  AROM PROM Strength  Location  AROM    PROM   Strength     68   70  4/5   Knee    Flexion (140)  86  93  4+/5     0    4/5                Extension (0)   0     4+/5         PMH from the Evaluation : Rayray is a 62 y.o. male referred to outpatient Physical Therapy with a medical diagnosis of Left Knee Pain. Rayray reports: He underwent Left TKA July 2019, Revision October 2020, and he has undergone 4 different Left Knee Manipulation and Scar tissue removal procedures. Last Manipulation was in 2021. He is here today due to continued Left Knee Pain and dysfunction. Left Knee presents with Flexion contracture and Left Knee significantly larger than the Right Knee. Patient presents with decreased Left Knee Flexion Range of Motion with hard end feel at end range. Left Knee is much larger than the Right most likely due to build up of scar tissue. Patient has difficulty with sit to stand especially from low chair. He shifts his weight to the Right in order to stand. Patient is noted to have decreased stance time and antalgic gait on the Left. Patient has decreased heel strike and hits foot flat with initial contact on the Left. He tends to keep the knee slightly flexed throughout gait cycle. Patient has decreased step/stride length resulting in slow michelle.  Patient will require Physical Therapy Intervention to address all deficits and work toward completion of all goals set. Therapist will refer patient back to MD upon completion of Therapy for further assessment if pain and dysfunction persist.          Rayray Is progressing well towards his goals.   Pt prognosis is Good.     Pt will continue to benefit from skilled  outpatient physical therapy to address the deficits listed in the problem list box on initial evaluation, provide pt/family education and to maximize pt's level of independence in the home and community environment.     Pt's spiritual, cultural and educational needs considered and pt agreeable to plan of care and goals.     Anticipated Barriers for therapy: Long Standing Left Knee Flexion Contracture     Goals:  Short Term Goals: 3 weeks   1. Independent with Home Exercise Program - Goal Ongoing   2. Increase Left Knee Range of Motion to 0 Degrees to 90 Degrees - Goal Partially Met (Passive Range of Motion is now 90 degrees with excessive overpressure)  3. Increase Left Knee Strength to grossly 4+/5 - Goal Met   4. Patient will ambulate 500 feet with Normal Gait pattern with complaints of pain Less than or Equal to 4/10. - Goal Not Met      Long Term Goals: 6 weeks   1. Patient will increase Left Knee Strength to grossly 5/5 . - Goal Not Met   2. Increase Left Knee Range of Motion to 0 Degrees to 100 Degrees. - Goal Not Met   3. Patient will ambulate 1000+ feet with Good Heel strike on the Left with complaints of pain Less than or Equal to 3/10. . - Goal Not Met       Plan     Updated Certification Period: 1/30/2023 to 3/17/2023  Recommended Treatment Plan: 2 times per week for 6 weeks: Electrical Stimulation to increase strength and decrease pain and inflammation as needed, Gait Training, Manual Therapy, Moist Heat/ Ice, Neuromuscular Re-ed, Patient Education, Therapeutic Activities, and Therapeutic Exercise       RADHA GARCIA, PT, DPT     1/30/2023

## 2023-02-03 ENCOUNTER — CLINICAL SUPPORT (OUTPATIENT)
Dept: REHABILITATION | Facility: HOSPITAL | Age: 63
End: 2023-02-03
Payer: OTHER GOVERNMENT

## 2023-02-03 DIAGNOSIS — M25.662 DECREASED RANGE OF MOTION OF LEFT KNEE: Primary | ICD-10-CM

## 2023-02-03 DIAGNOSIS — M25.562 ACUTE PAIN OF LEFT KNEE: ICD-10-CM

## 2023-02-03 DIAGNOSIS — R29.898 WEAKNESS OF LEFT LEG: ICD-10-CM

## 2023-02-03 PROCEDURE — 97110 THERAPEUTIC EXERCISES: CPT

## 2023-02-03 PROCEDURE — 97530 THERAPEUTIC ACTIVITIES: CPT

## 2023-02-03 PROCEDURE — 97140 MANUAL THERAPY 1/> REGIONS: CPT

## 2023-02-03 NOTE — PROGRESS NOTES
[facility]   Physical Therapy Treatment Note     Name: Rayray Hutchins  Clinic Number: [id number]  Therapy Diagnosis: Left Knee Pain   Physician: Dr Ilir Zimmerman   [phone] Fax  Visit Date: 2/3/2023     Physician Orders: PT Eval and Treat   Medical Diagnosis from Referral: Left Knee Pain   Evaluation Date: 11/28/2022  Authorization Period Expiration: 11/15/2023  Plan of Care Expiration: 3/17/2023  Visit # / Visits authorized: 15/15  PTA VISIT 0/5    Time In: 11:00 pm  Time Out: 12:00 pm  Total Billable Time: 60  minutes    Precautions: Standard  Functional Level at time of Evaluation:  Since the first knee surgery in 2019 he has had continued pain and dysfunction in the Left Knee     Subjective     Pt reports: [facility] JOSHUA splint. pain at 8/10.   He was compliant with home exercise program.    Pain: 8/10 upon arrival to Therapy; 9/10 with Stretches  Location: left knee      Objective     Rayray received therapeutic exercises to develop strength, endurance, ROM, and flexibility for 35 minutes including:    Knee Exercises     Bike  5 Min (Seat #5)   Calf Stretch  5x20 seconds   Hamstring Stretch  5x20 seconds   Quad Stretch  8 x 15 sec    Cybex Leg Press - Bilateral  10 plates 3 x 10   Cybex Leg Press - Left Only   7 plates 3 x 10   Cybex Knee Extension  3 x 10 3 plates   Cybex Hamstring Curls  5 plates 3 x 10 SINGLE LEG   Cybex Hip - Abduction bilateral   3 plates 20x   Cybex Hip - Flexion bilateral   3 plates 20x   Cybex Hip - Extension     Cable Knee TKE  5 plates 30x   Supine Ball Rolls into Flexion   30x (not performed today)    SL progressive bridges   3x10 (not performed today)    Wall Squats with Ball  20x         Prone Knee Flexion Stretch   4 x 30 sec hold with green strap (not performed today)          NK Table   Manual Therapy                                    Rayray received the following THERAPEUTIC ACTIVITIES to improve  functional performance for 10 Minutes  Fwd Steps x 30  Lateral Step x 30    Rayray received the following manual therapy techniques: Joint mobilizations, Myofacial release, and Soft tissue Mobilization were applied to the: Left Knee for 15 minutes, including:    Graston with GT to left quad in prolonged stretch position - NTV  Manual Prone Quad  Progressive Flexion Bridges  NK chair for progressive creep stretch - Knee Extension x 10 with 8.8 lbs followed by flexion stretch with over-pressure and 2 8.8 lbs plates x 10 Minutes for creep stretch       Rayray participated in neuromuscular re-education activities to improve: Balance, Coordination, Kinesthetic, and Proprioception for 0 minutes. The following activities were included:  Single Leg Stance on Left     Home Exercises Provided and Patient Education Provided     Education provided: Reviewed his home exercise and stretching program and instructed him to stretch diligently at home    Written Home Exercises Provided: yes.  Exercises were reviewed and Rayray was able to demonstrate them prior to the end of the session.  Rayray demonstrated good  understanding of the education provided.     See EMR under Patient Instructions for exercises provided prior visit.    Assessment     Therapist has seen patient for 15 visits. We have performed aggressive Range of Motion and Strengthening of the Left Knee. Therapist and patient have requested additional visits for Therapy and have requested a JOSHUA splint. We are [facility] the [facility]. We will notify the patient once we hear about the additional visits and/or the JOSHUA splint. We hope to be able to continue to work with patient to increase his strength, Range of Motion, and overall mobility. Waiting on [facility] at this time.     Updated Measurements from 1/30/2023 are listed below:       Range of Motion/Strength :   Left Extremity on 11/28/2022                                                           Left Extremity on  1/905718  AROM PROM Strength  Location  AROM    PROM   Strength     68   70  4/5   Knee    Flexion (140)  86  93  4+/5     0    4/5                Extension (0)   0     4+/5         PMH from the Evaluation : Rayray is a 62 y.o. male referred to outpatient Physical Therapy with a medical diagnosis of Left Knee Pain. Rayray reports: He underwent Left TKA July 2019, Revision October 2020, and he has undergone 4 different Left Knee Manipulation and Scar tissue removal procedures. Last Manipulation was in 2021. He is here today due to continued Left Knee Pain and dysfunction. Left Knee presents with Flexion contracture and Left Knee significantly larger than the Right Knee. Patient presents with decreased Left Knee Flexion Range of Motion with hard end feel at end range. Left Knee is much larger than the Right most likely due to build up of scar tissue. Patient has difficulty with sit to stand especially from low chair. He shifts his weight to the Right in order to stand. Patient is noted to have decreased stance time and antalgic gait on the Left. Patient has decreased heel strike and hits foot flat with initial contact on the Left. He tends to keep the knee slightly flexed throughout gait cycle. Patient has decreased step/stride length resulting in slow michelle.  Patient will require Physical Therapy Intervention to address all deficits and work toward completion of all goals set. Therapist will refer patient back to MD upon completion of Therapy for further assessment if pain and dysfunction persist.          Rayray Is progressing well towards his goals.   Pt prognosis is Good.     Pt will continue to benefit from skilled outpatient physical therapy to address the deficits listed in the problem list box on initial evaluation, provide pt/family education and to maximize pt's level of independence in the home and community environment.     Pt's spiritual, cultural and educational needs considered and pt agreeable to  plan of care and goals.     Anticipated Barriers for therapy: Long Standing Left Knee Flexion Contracture     Goals:  Short Term Goals: 3 weeks   1. Independent with Home Exercise Program - Goal Ongoing   2. Increase Left Knee Range of Motion to 0 Degrees to 90 Degrees - Goal Partially Met (Passive Range of Motion is now 90 degrees with excessive overpressure)  3. Increase Left Knee Strength to grossly 4+/5 - Goal Met   4. Patient will ambulate 500 feet with Normal Gait pattern with complaints of pain Less than or Equal to 4/10. - Goal Not Met      Long Term Goals: 6 weeks   1. Patient will increase Left Knee Strength to grossly 5/5 . - Goal Not Met   2. Increase Left Knee Range of Motion to 0 Degrees to 100 Degrees. - Goal Not Met   3. Patient will ambulate 1000+ feet with Good Heel strike on the Left with complaints of pain Less than or Equal to 3/10. . - Goal Not Met       Plan     Updated Certification Period: 1/30/2023 to 3/17/2023  Recommended Treatment Plan: 2 times per week for 6 weeks: Electrical Stimulation to increase strength and decrease pain and inflammation as needed, Gait Training, Manual Therapy, Moist Heat/ Ice, Neuromuscular Re-ed, Patient Education, Therapeutic Activities, and Therapeutic Exercise       RADHA GARCIA, PT, DPT     2/3/2023

## 2023-03-21 ENCOUNTER — CLINICAL SUPPORT (OUTPATIENT)
Dept: REHABILITATION | Facility: HOSPITAL | Age: 63
End: 2023-03-21
Payer: OTHER GOVERNMENT

## 2023-03-21 DIAGNOSIS — R29.898 WEAKNESS OF LEFT LEG: ICD-10-CM

## 2023-03-21 DIAGNOSIS — M25.662 DECREASED RANGE OF MOTION OF LEFT KNEE: Primary | ICD-10-CM

## 2023-03-21 DIAGNOSIS — M25.562 ACUTE PAIN OF LEFT KNEE: ICD-10-CM

## 2023-03-21 PROCEDURE — 97164 PT RE-EVAL EST PLAN CARE: CPT

## 2023-03-21 PROCEDURE — 97140 MANUAL THERAPY 1/> REGIONS: CPT

## 2023-03-21 PROCEDURE — 97110 THERAPEUTIC EXERCISES: CPT

## 2023-03-21 NOTE — PLAN OF CARE
[facility]   Physical Therapy Re-Evaluation     Date: 3/21/2023   Name: Rayray Hutchins  Clinic Number: [id number]     Therapy Diagnosis: Left Knee Pain   Physician: AMADOR Muse     Physician Orders: PT Eval and Treat   Medical Diagnosis from Referral: Left Knee Pain   Evaluation Date: 11/28/2022  Re-Evaluation Date : 3/21/2023  Authorization Period Expiration: 7/15/2023  Plan of Care Expiration: 7/15/2023  Visit # / Visits authorized: 16/ 30     Time In: 3:05 pm   Time Out: 4:00 pm   Total Appointment Time (timed & untimed codes): 55 minutes     Precautions: Standard     Subjective   Date of onset: 9/1/2022  History at time of Evaluation - Rayray reports: He underwent Left TKA July 2019, Revision October 2020, and he has undergone 4 different Left Knee Manipulation and Scar tissue removal procedures. Last Manipulation was in 2021. He is here today due to continued Left Knee Pain and dysfunction. Left Knee presents with Flexion contracture and Left Knee significantly larger than the Right Knee.   History at time of Re-Evaluation : [facility]ray reports [facility]. They finally approved 15 visits and 120 days. He is here today for a Re-Evaluation to resume his Physical Therapy. He reports he has been stretching daily, but he has already lost some Range of Motion in the Left Knee. Knee is stiff and painful with pain reported at 8/10.        Medical History: DM     Surgical History:   Rayray Hutchins has hx of Left TKA, Left Total Knee Revision, and 4 manipulations/scar tissue removal procedures      Medications:   Rayray currently has no medications in their medication list.     Allergies:   Review of patient's allergies indicates:  Not on File      Imaging, None here      Prior Therapy: He received Therapy from 11/28/2022 through 2/3/2023  Social History:  lives alone  Occupation: Retired from [employer]; Exercises [employer]   Prior  "Level of Function: He was able to ambulate several years prior to the Left Knee Replacement  Current Level of Function: Since the first knee surgery in 2019 he has had continued pain and dysfunction in the Left Knee     Pain:  Current 8/10, worst 10/10, best 6/10   Location: left knee    Description: Aching, Dull, Throbbing, Tight, and Numb  Aggravating Factors: Prolonged standing and walking; trying to flex the knee   Easing Factors: nothing; He does take pain meds but this does not really help      Patients goals: "I need my knee to work right. I want to stay off the cane, but it hurts so bad right now."     Objective            Observation : Left Knee much larger than Right knee most likely due to build up of scar tissue     Incision :    Well healed years ago         Girth Measurements :      Right Lower Extremity :  Mid Patella 37  cm        Left Lower Extremity :  Mid Patella 42  cm            Range of Motion/Strength :                   Left Extremity                                                                        Right Extremity   AROM PROM Strength  Location  AROM    PROM   Strength           Hip      Flexion (140)                             Extension (10)                             Internal Rotation (40)                             External Rotation (50)                             Abduction (45)                             Adduction (30)          77   88  4/5   Knee    Flexion (140)  135    5/5     -5    0  4/5                Extension (0)   0     5/5           Ankle   Dorsiflexion (20)                              Plantar Flexion (50)                              Inversion (35)                              Eversion (25)                                  Functional Impairments : Patient continues to have antalgic gait on the Left causing decreased stance time and shortened step length on the Left. He is trying to walk without the cane, but long distances are very difficult and painful. Patient " ambulates with the Left Knee flexed at approximately 20 degrees at all times while walking.           Limitation/Restriction for FOTO Intake Knee Survey on 11/28/2022     Therapist reviewed FOTO scores for Rayray Hutchins on 11/28/2022.   FOTO documents entered into [facility] - see Media section.     Limitation Score: 65%            TREATMENT   Treatment Time In: 3:30 pm   Treatment Time Out: 4:00 pm   Total Treatment time (time-based codes) separate from Evaluation: 30 minutes      See Treatment Note dated today for details        Assessment   Rayray is a 62 y.o. male referred to outpatient Physical Therapy with a medical diagnosis of Left Knee Pain. Rayray is here today for Re-Evaluation of the Left Knee. He received Therapy from 11/28/2022 through 2/3/2023. [facility] and have been awaiting approval since that time. He has finally received approval for 15 additional visits and 120 days of Physical Therapy. Even with working out and stretching on his own, his Left Knee Passive Flexion Range of Motion decreased from 93 degrees to 88 degrees. That knee remains tight and painful. Other measurements are listed above in the Objective portion of this Evaluation. We will resume therapy and continue to work toward the previous goals set.       Assessments from the Evaluation on 11/28/2022 is as follows :  Rayray underwent Left TKA July 2019, Revision October 2020, and he has undergone 4 different Left Knee Manipulation and Scar tissue removal procedures. Last Manipulation was in 2021. He is here today due to continued Left Knee Pain and dysfunction. Left Knee presents with Flexion contracture and Left Knee significantly larger than the Right Knee. Patient presents with decreased Left Knee Flexion Range of Motion with hard end feel at end range. Left Knee is much larger than the Right most likely due to build up of scar tissue. Patient has difficulty with sit to stand especially from low  chair. He shifts his weight to the Right in order to stand. Patient is noted to have decreased stance time and antalgic gait on the Left. Patient has decreased heel strike and hits foot flat with initial contact on the Left. He tends to keep the knee slightly flexed throughout gait cycle. Patient has decreased step/stride length resulting in slow michelle.  Patient will require Physical Therapy Intervention to address all deficits and work toward completion of all goals set. Therapist will refer patient back to MD upon completion of Therapy for further assessment if pain and dysfunction persist.         Patient prognosis is Good.   Patientt will benefit from skilled outpatient Physical Therapy to address the deficits stated above and in the chart below, provide patient /family education, and to maximize patientt's level of independence.      Plan of care discussed with patient: Yes  Patient's spiritual, cultural and educational needs considered and patient is agreeable to the plan of care and goals as stated below:      Anticipated Barriers for therapy: Long Standing Left Knee Flexion Contracture     Goals:  Short Term Goals: 6 weeks   1. Independent with Home Exercise Program   2. Increase Left Knee Range of Motion to 0 Degrees to 90 Degrees  3. Increase Left Knee Strength to grossly 4+/5  4. Patient will ambulate 500 feet with stance time more equal from Left to Right with complaints of pain Less than or Equal to 4/10.       Long Term Goals: 12 weeks   1. Patient will increase Left Knee Strength to grossly 5/5  2. Increase Left Knee Range of Motion to 0 Degrees to 100 Degrees  3. Patient will ambulate 1000+ feet with Good Heel strike on the Left with complaints of pain Less than or Equal to 3/10.         Plan   Plan of care Certification: 3/21/2023 to 7/15/2023.     Outpatient Physical Therapy 2 times weekly for 12 weeks to include the following interventions: Electrical Stimulation to increase strength and  decrease pain and inflammation as neeeded, Gait Training, Manual Therapy, Moist Heat/ Ice, Neuromuscular Re-ed, Patient Education, Therapeutic Activities, Therapeutic Exercise, and Ultrasound.      RADHA GARCIA, PT, DPT        I CERTIFY THE NEED FOR THESE SERVICES FURNISHED UNDER THIS PLAN OF TREATMENT AND WHILE UNDER MY CARE.     Physician's comments:        Physician's Signature: ___________________________________________________

## 2023-03-21 NOTE — PROGRESS NOTES
[facility]   Physical Therapy Treatment Note     Name: Rayray Hutchins  Clinic Number: [id number]    Therapy Diagnosis: Left Knee Pain   Physician: AMADOR Muse    Visit Date: 3/21/2023     Physician Orders: PT Eval and Treat   Medical Diagnosis from Referral: Left Knee Pain   Evaluation Date: 11/28/2022  Re-Evaluation Date : 3/21/2023  Authorization Period Expiration: 7/15/2023  Plan of Care Expiration: 7/15/2023  Visit # / Visits authorized: 16/ 30     Re-Evaluation Time In: 3:05 pm   Re-Evaluation Time Out: 3:30 pm   Total Appointment Time (timed & untimed codes): 25 minutes    Treatment Time In: 3:30 pm   Treatment Time Out: 4:00 pm   Total Appointment Time (timed & untimed codes): 30 minutes      Precautions: Standard  Functional Level at time of Evaluation:  Since the first knee surgery in 2019 he has had continued pain and dysfunction in the Left Knee     Subjective     Pt reports: He [facility] got the [facility] for [facility] more visits with Physical Therapy and is here today for the Re-Evaluation and Treatment   He was compliant with home exercise program.    Pain: 8/10 upon arrival to Therapy; 9/10 with Stretches  Location: left knee      Objective     Rayray received therapeutic exercises to develop strength, endurance, ROM, and flexibility for 15 minutes including:    Knee Exercises     Bike  5 Min (Seat #5 today; will drop to Seat #4 next visit)   Calf Stretch  5x20 seconds   Hamstring Stretch  5x20 seconds   Quad Stretch  8 x 15 sec    Cybex Leg Press - Bilateral  10 plates 3 x 10   Cybex Leg Press - Left Only   7 plates 3 x 10   Cybex Knee Extension  3 x 10 3 plates   Cybex Hamstring Curls  5 plates 3 x 10 SINGLE LEG   Cybex Hip - Abduction bilateral   3 plates 20x   Cybex Hip - Flexion bilateral   3 plates 20x   Cybex Hip - Extension     Cable Knee TKE  5 plates 30x   Supine Ball Rolls into Flexion   30x (not performed today)    SL progressive bridges   3x10 (not  performed today)    Wall Squats with Ball  20x         Prone Knee Flexion Stretch   4 x 30 sec hold with green strap (not performed today)          NK Table   Manual Therapy                                    Rayray received the following THERAPEUTIC ACTIVITIES to improve functional performance for 0 Minutes  Fwd Steps x 30  Lateral Step x 30    Rayray received the following manual therapy techniques: Joint mobilizations, Myofacial release, and Soft tissue Mobilization were applied to the: Left Knee for 15 minutes, including:    [place] with GT to left quad in prolonged stretch position - NTV  Manual Prone Quad  Manual Supine Knee Flexion Range of Motion   NK chair for progressive creep stretch - Knee Extension x 10 with 8.8 lbs followed by flexion stretch with over-pressure and 2 8.8 lbs plates x 10 Minutes for creep stretch       Rayray participated in neuromuscular re-education activities to improve: Balance, Coordination, Kinesthetic, and Proprioception for 0 minutes. The following activities were included:  Single Leg Stance on Left     Home Exercises Provided and Patient Education Provided     Education provided: Reviewed his home exercise and stretching program and instructed him to stretch diligently at home    Written Home Exercises Provided: yes.  Exercises were reviewed and Rayray was able to demonstrate them prior to the end of the session.  Rayray demonstrated good  understanding of the education provided.     See EMR under Patient Instructions for exercises provided prior visit.    Assessment     Patient has finally received his approval [facility] the [facility] for additional Physical Therapy visits. He received approval for 15 visits and 120 days. Therapist performed Re-Evaluation today. See Treatment section of this note for the Re-Evaluation details. He also received treatment today as outlined above. We concentrated on warm up followed by aggressive Left Knee Flexion stretches. He is very stiff and even though  he has been working out at home he has lost Knee Flexion Range of Motion since his last Therapy visit on 2/3/2023. We are also working on getting him an approval for a JOSHUA splint to help maintain and gain knee Range of Motion. The MD has now ordered this and we are awaiting [facility] approval. Patient was given phone numbers today by the Therapist for JOSHUA splint reps so he can follow up with them and get a fitting scheduled as soon as [facility] allows. We will resume his Therapy at this time. Sup Visit performed today with IZABEL Perez and IZABEL Jackson.  All goals and treatment plan reviewed. Will work toward completion of all goals set.     His new updated Range of Motion Measurements comparing Therapy session on 1/30/2023 and today, 3/21/2023, are listed below.        Range of Motion/Strength :   Left Extremity on 3/21/2023                                                           Left Extremity on 1/302023  AROM PROM Strength  Location  AROM    PROM   Strength     77   88  4/5   Knee    Flexion (140)  86  93  4+/5     -5  0  4/5                Extension (0)   0     4+/5         PMH from the Evaluation : Rayray is a 62 y.o. male referred to outpatient Physical Therapy with a medical diagnosis of Left Knee Pain. Rayray reports: He underwent Left TKA July 2019, Revision October 2020, and he has undergone 4 different Left Knee Manipulation and Scar tissue removal procedures. Last Manipulation was in 2021. He is here today due to continued Left Knee Pain and dysfunction. Left Knee presents with Flexion contracture and Left Knee significantly larger than the Right Knee. Patient presents with decreased Left Knee Flexion Range of Motion with hard end feel at end range. Left Knee is much larger than the Right most likely due to build up of scar tissue. Patient has difficulty with sit to stand especially from low chair. He shifts his weight to the Right in order to stand. Patient is noted to have decreased stance time and  antalgic gait on the Left. Patient has decreased heel strike and hits foot flat with initial contact on the Left. He tends to keep the knee slightly flexed throughout gait cycle. Patient has decreased step/stride length resulting in slow michelle.  Patient will require Physical Therapy Intervention to address all deficits and work toward completion of all goals set. Therapist will refer patient back to MD upon completion of Therapy for further assessment if pain and dysfunction persist.          Rayray Is progressing well towards his goals.   Pt prognosis is Good.     Pt will continue to benefit from skilled outpatient physical therapy to address the deficits listed in the problem list box on initial evaluation, provide pt/family education and to maximize pt's level of independence in the home and community environment.     Pt's spiritual, cultural and educational needs considered and pt agreeable to plan of care and goals.    Anticipated Barriers for therapy: Long Standing Left Knee Flexion Contracture     Goals:  Short Term Goals: 6 weeks   1. Independent with Home Exercise Program   2. Increase Left Knee Range of Motion to 0 Degrees to 90 Degrees  3. Increase Left Knee Strength to grossly 4+/5  4. Patient will ambulate 500 feet with stance time more equal from Left to Right with complaints of pain Less than or Equal to 4/10.       Long Term Goals: 12 weeks   1. Patient will increase Left Knee Strength to grossly 5/5  2. Increase Left Knee Range of Motion to 0 Degrees to 100 Degrees  3. Patient will ambulate 1000+ feet with Good Heel strike on the Left with complaints of pain Less than or Equal to 3/10.         Plan   Plan of care Certification: 3/21/2023 to 7/15/2023.     Outpatient Physical Therapy 2 times weekly for 12 weeks to include the following interventions: Electrical Stimulation to increase strength and decrease pain and inflammation as neeeded, Gait Training, Manual Therapy, Moist Heat/ Ice,  Neuromuscular Re-ed, Patient Education, Therapeutic Activities, Therapeutic Exercise, and Ultrasound.        RADHA GARCIA, PT, DPT     3/21/2023

## 2023-03-28 ENCOUNTER — CLINICAL SUPPORT (OUTPATIENT)
Dept: REHABILITATION | Facility: HOSPITAL | Age: 63
End: 2023-03-28
Payer: OTHER GOVERNMENT

## 2023-03-28 DIAGNOSIS — M25.662 DECREASED RANGE OF MOTION OF LEFT KNEE: Primary | ICD-10-CM

## 2023-03-28 DIAGNOSIS — M25.562 ACUTE PAIN OF LEFT KNEE: ICD-10-CM

## 2023-03-28 DIAGNOSIS — R29.898 WEAKNESS OF LEFT LEG: ICD-10-CM

## 2023-03-28 PROCEDURE — 97140 MANUAL THERAPY 1/> REGIONS: CPT | Mod: CQ

## 2023-03-28 PROCEDURE — 97110 THERAPEUTIC EXERCISES: CPT | Mod: CQ

## 2023-03-28 NOTE — PROGRESS NOTES
[facility]   Physical Therapy Treatment Note     Name: Rayray Hutchins  Clinic Number: [id number]  Therapy Diagnosis: Left Knee Pain   Physician: AMADOR Muse  Visit Date: 3/28/2023  Physician Orders: PT Eval and Treat   Medical Diagnosis from Referral: Left Knee Pain     Evaluation Date: 11/28/2022  Re-Evaluation Date : 3/21/2023  Authorization Period Expiration: 7/15/2023  Plan of Care Expiration: 7/15/2023  Visit # / Visits authorized: 17 / 30   PTA VISIT # 1/5    Treatment Time In: 3:00 pm   Treatment Time Out: 3:39 pm   Total Appointment Time (timed & untimed codes): 39 minutes    Precautions: Standard  Functional Level at time of Evaluation:  Since the first knee surgery in 2019 he has had continued pain and dysfunction in the Left Knee     Subjective     Pt reports: He has about 8/10 p! Today upon arrival with swelling present in the knee.  Still awaiting to hear from the [facility] for approval with the JOSHUA splint.   He was compliant with home exercise program.    Pain: 8/10 upon arrival to Therapy; 9/10 with Stretches  Location: left knee      Objective     Rayray received therapeutic exercises to develop strength, endurance, ROM, and flexibility for 25 minutes including:    Knee Exercises  Bike  5 Min (Seat #5 today; will drop to Seat #4 next visit)   Calf Stretch  5x20 seconds   Hamstring Stretch  5x20 seconds   Quad Stretch  8 x 15 sec at step   Cybex Leg Press - Bilateral  10 plates 3 x 10   Cybex Leg Press - Left Only   6 plates 3 x 10   Cybex Knee Extension  3 x 10 3 plates   Cybex Hamstring Curls  5 plates 3 x 10 SINGLE LEG   Cybex Hip - Abduction bilateral   3 plates 20x   Cybex Hip - Flexion bilateral   3 plates 20x   Cybex Hip - Extension     Cable Knee TKE  5 plates 30x   Supine Ball Rolls into Flexion   30x (not performed today)    SL progressive bridges   3x10 (not performed today)    Wall Squats with Ball  20x         Prone Knee Flexion Stretch   4 x 30 sec  hold with green strap (not performed today)          NK Table   Manual Therapy                                    Rayray received the following THERAPEUTIC ACTIVITIES to improve functional performance for 0 Minutes  Fwd Steps x 30  Lateral Step x 30    Rayray received the following manual therapy techniques: Joint mobilizations, Myofacial release, and Soft tissue Mobilization were applied to the: Left Knee for 14 minutes, including:    NK chair for progressive creep stretch - Knee Extension x 10 with 8.8 lbs followed by flexion stretch with over-pressure and 2 8.8 lbs plates x 10 Minutes for creep stretch       Rayray participated in neuromuscular re-education activities to improve: Balance, Coordination, Kinesthetic, and Proprioception for 0 minutes. The following activities were included:  Single Leg Stance on Left     Home Exercises Provided and Patient Education Provided     Education provided: Reviewed his home exercise and stretching program and instructed him to stretch diligently at home    Written Home Exercises Provided: yes.  Exercises were reviewed and Rayray was able to demonstrate them prior to the end of the session.  Rayray demonstrated good  understanding of the education provided.     See EMR under Patient Instructions for exercises provided prior visit.    Assessment     86 degrees LLE active range of motion knee flexion on step   94 degrees after NK table       Patient arrived with reports of needing to be on conference call at 2:30, therefore treatment began late. Therapist concentrated on warm up followed by aggressive Left Knee Flexion stretches utilizing creep stretch at NK table.  He is very stiff and has edema present in the knee. ROM at beginning of treatment today was 86 degrees left knee flexion on step and after extensive stretching at NK table he improved to 96 degrees left knee flexion.  We are also continuing to work on getting him an approval for a JOSHUA splint to help maintain and  gain knee Range of Motion. The MD has now ordered this and we are awaiting [facility] approval. Will continue to progress as tolerated when Patient returns.       Range of Motion/Strength :   Left Extremity on 3/28/2023                                                           Left Extremity on 1/302023  AROM PROM Strength  Location  AROM    PROM   Strength     86   96  4/5   Knee    Flexion (140)  86  93  4+/5     -5  0  4/5                Extension (0)   0     4+/5         PMH from the Evaluation : Rayray is a 62 y.o. male referred to outpatient Physical Therapy with a medical diagnosis of Left Knee Pain. Rayray reports: He underwent Left TKA July 2019, Revision October 2020, and he has undergone 4 different Left Knee Manipulation and Scar tissue removal procedures. Last Manipulation was in 2021. He is here today due to continued Left Knee Pain and dysfunction. Left Knee presents with Flexion contracture and Left Knee significantly larger than the Right Knee. Patient presents with decreased Left Knee Flexion Range of Motion with hard end feel at end range. Left Knee is much larger than the Right most likely due to build up of scar tissue. Patient has difficulty with sit to stand especially from low chair. He shifts his weight to the Right in order to stand. Patient is noted to have decreased stance time and antalgic gait on the Left. Patient has decreased heel strike and hits foot flat with initial contact on the Left. He tends to keep the knee slightly flexed throughout gait cycle. Patient has decreased step/stride length resulting in slow michelle.  Patient will require Physical Therapy Intervention to address all deficits and work toward completion of all goals set. Therapist will refer patient back to MD upon completion of Therapy for further assessment if pain and dysfunction persist.          Rayray Is progressing well towards his goals.   Pt prognosis is Good.     Pt will continue to benefit from skilled outpatient  physical therapy to address the deficits listed in the problem list box on initial evaluation, provide pt/family education and to maximize pt's level of independence in the home and community environment.     Pt's spiritual, cultural and educational needs considered and pt agreeable to plan of care and goals.    Anticipated Barriers for therapy: Long Standing Left Knee Flexion Contracture     Goals:  Short Term Goals: 6 weeks   1. Independent with Home Exercise Program   2. Increase Left Knee Range of Motion to 0 Degrees to 90 Degrees  3. Increase Left Knee Strength to grossly 4+/5  4. Patient will ambulate 500 feet with stance time more equal from Left to Right with complaints of pain Less than or Equal to 4/10.       Long Term Goals: 12 weeks   1. Patient will increase Left Knee Strength to grossly 5/5  2. Increase Left Knee Range of Motion to 0 Degrees to 100 Degrees  3. Patient will ambulate 1000+ feet with Good Heel strike on the Left with complaints of pain Less than or Equal to 3/10.      Plan     Plan of care Certification: 3/21/2023 to 7/15/2023.     Outpatient Physical Therapy 2 times weekly for 12 weeks to include the following interventions: Electrical Stimulation to increase strength and decrease pain and inflammation as neeeded, Gait Training, Manual Therapy, Moist Heat/ Ice, Neuromuscular Re-ed, Patient Education, Therapeutic Activities, Therapeutic Exercise, and Ultrasound.        Lencho Forrester, PTA    3/28/2023

## 2023-03-30 ENCOUNTER — CLINICAL SUPPORT (OUTPATIENT)
Dept: REHABILITATION | Facility: HOSPITAL | Age: 63
End: 2023-03-30
Payer: OTHER GOVERNMENT

## 2023-03-30 DIAGNOSIS — M25.662 DECREASED RANGE OF MOTION OF LEFT KNEE: Primary | ICD-10-CM

## 2023-03-30 DIAGNOSIS — M25.562 ACUTE PAIN OF LEFT KNEE: ICD-10-CM

## 2023-03-30 DIAGNOSIS — R29.898 WEAKNESS OF LEFT LEG: ICD-10-CM

## 2023-03-30 PROCEDURE — 97110 THERAPEUTIC EXERCISES: CPT | Mod: CQ

## 2023-03-30 PROCEDURE — 97140 MANUAL THERAPY 1/> REGIONS: CPT | Mod: CQ

## 2023-03-30 NOTE — PROGRESS NOTES
[facility]   Physical Therapy Treatment Note     Name: Rayray Hutchins  Clinic Number: [id number]  Therapy Diagnosis: Left Knee Pain   Physician: AMADOR Muse  Visit Date: 3/30/2023  Physician Orders: PT Eval and Treat   Medical Diagnosis from Referral: Left Knee Pain     Evaluation Date: 11/28/2022  Re-Evaluation Date : 3/21/2023  Authorization Period Expiration: 7/15/2023  Plan of Care Expiration: 7/15/2023  Visit # / Visits authorized: 18 / 30   PTA VISIT # 2/5    Treatment Time In: 2:24 pm   Treatment Time Out: 3:17 pm   Total Appointment Time (timed & untimed codes): 53 minutes    Precautions: Standard     Functional Level at time of Evaluation:  Since the first knee surgery in 2019 he has had continued pain and dysfunction in the Left Knee     Subjective     Pt reports: sore today after last visit on Tuesday. Reports he talked to the [facility] nurse yesterday on the phone and also his doctor in [place] and they are still waiting for approval for the JOSHUA splint at this point. He has about 7/10 p! Today upon arrival with swelling present in the knee.     He was compliant with home exercise program.    Pain: 7/10 upon arrival to Therapy; 9/10 with Stretches  Location: left knee      Objective     Rayray received therapeutic exercises to develop strength, endurance, ROM, and flexibility for 25 minutes including:    Knee Exercises  Bike  5 Min (seat #4 today)   Calf Stretch  5x20 seconds on slant board    Hamstring Stretch  5x20 seconds   Quad Stretch  8 x 15 sec at step   Cybex Leg Press - Bilateral  10 plates 3 x 10   Cybex Leg Press - Left Only   6 plates 3 x 10       Cybex Knee Extension  3 x 10 3 plates   Cybex Hamstring Curls  5 plates 3 x 10 SINGLE LEG   Cybex Hip - Abduction bilateral   3 plates 20x   Cybex Hip - Flexion bilateral   3 plates 20x   Cybex Hip - Extension     Cable Knee TKE  5 plates 30x   Supine Ball Rolls into Flexion   30x (not performed today)    SL  progressive bridges   3x10 (not performed today)    Wall Squats with Ball  20x         Prone Knee Flexion Stretch   4 x 30 sec hold with green strap         NK Table   Manual Therapy                                    Rayray received the following THERAPEUTIC ACTIVITIES to improve functional performance for 0 Minutes  Fwd Steps x 30  Lateral Step x 30    Rayray received the following manual therapy techniques: Joint mobilizations, Myofacial release, and Soft tissue Mobilization were applied to the: Left Knee for 23 minutes, including:    NK chair for progressive creep stretch - Knee Extension x 10 with 8.8 lbs followed by flexion stretch with over-pressure and 2 8.8 lbs plates x 10 Minutes for creep stretch     Rayray participated in neuromuscular re-education activities to improve: Balance, Coordination, Kinesthetic, and Proprioception for 0 minutes. The following activities were included:  Single Leg Stance on Left     Home Exercises Provided and Patient Education Provided     Education provided: Reviewed his home exercise and stretching program and instructed him to stretch diligently at home    Written Home Exercises Provided: yes.  Exercises were reviewed and Rayray was able to demonstrate them prior to the end of the session.  Rayray demonstrated good  understanding of the education provided.     See EMR under Patient Instructions for exercises provided prior visit.    Assessment     86 degrees LLE active range of motion knee flexion on step   96 degrees after NK table     Patient arrived with reports of having a phone call with MD in [place] and [facility] yesterday to try and get paperwork approved for JOSHUA splint. Patient tolerated bike seat on #4 today initially starting out, previously he had to have the seat raised up a level before lowering to #4 due to stiffness into knee flexion. Therapist concentrated on warm up followed by aggressive Left Knee Flexion stretches utilizing creep stretch at NK table.  He is  very stiff and has edema present in the knee. ROM at beginning of treatment today was 86 degrees left knee flexion on step and after extensive stretching at NK table he improved to 96 degrees left knee flexion.  We are also continuing to work on getting him an approval for a JOSHUA splint to help maintain and gain knee Range of Motion. The MD has now ordered this and we are awaiting [facility] approval. Will continue to progress as tolerated when Patient returns.       Range of Motion/Strength :   Left Extremity on 3/28/2023                                                           Left Extremity on 1/302023  AROM PROM Strength  Location  AROM    PROM   Strength     86   96  4/5   Knee    Flexion (140)  86  93  4+/5     -5  0  4/5                Extension (0)   0     4+/5         PMH from the Evaluation : Rayray is a 62 y.o. male referred to outpatient Physical Therapy with a medical diagnosis of Left Knee Pain. Rayray reports: He underwent Left TKA July 2019, Revision October 2020, and he has undergone 4 different Left Knee Manipulation and Scar tissue removal procedures. Last Manipulation was in 2021. He is here today due to continued Left Knee Pain and dysfunction. Left Knee presents with Flexion contracture and Left Knee significantly larger than the Right Knee. Patient presents with decreased Left Knee Flexion Range of Motion with hard end feel at end range. Left Knee is much larger than the Right most likely due to build up of scar tissue. Patient has difficulty with sit to stand especially from low chair. He shifts his weight to the Right in order to stand. Patient is noted to have decreased stance time and antalgic gait on the Left. Patient has decreased heel strike and hits foot flat with initial contact on the Left. He tends to keep the knee slightly flexed throughout gait cycle. Patient has decreased step/stride length resulting in slow michelle.  Patient will require Physical Therapy Intervention to address all  deficits and work toward completion of all goals set. Therapist will refer patient back to MD upon completion of Therapy for further assessment if pain and dysfunction persist.          Rayray Is progressing well towards his goals.   Pt prognosis is Good.     Pt will continue to benefit from skilled outpatient physical therapy to address the deficits listed in the problem list box on initial evaluation, provide pt/family education and to maximize pt's level of independence in the home and community environment.     Pt's spiritual, cultural and educational needs considered and pt agreeable to plan of care and goals.    Anticipated Barriers for therapy: Long Standing Left Knee Flexion Contracture     Goals:  Short Term Goals: 6 weeks   1. Independent with Home Exercise Program   2. Increase Left Knee Range of Motion to 0 Degrees to 90 Degrees  3. Increase Left Knee Strength to grossly 4+/5  4. Patient will ambulate 500 feet with stance time more equal from Left to Right with complaints of pain Less than or Equal to 4/10.       Long Term Goals: 12 weeks   1. Patient will increase Left Knee Strength to grossly 5/5  2. Increase Left Knee Range of Motion to 0 Degrees to 100 Degrees  3. Patient will ambulate 1000+ feet with Good Heel strike on the Left with complaints of pain Less than or Equal to 3/10.      Plan     Plan of care Certification: 3/21/2023 to 7/15/2023.     Outpatient Physical Therapy 2 times weekly for 12 weeks to include the following interventions: Electrical Stimulation to increase strength and decrease pain and inflammation as neeeded, Gait Training, Manual Therapy, Moist Heat/ Ice, Neuromuscular Re-ed, Patient Education, Therapeutic Activities, Therapeutic Exercise, and Ultrasound.        Lencho Forrester, PTA    3/30/2023

## 2023-04-04 ENCOUNTER — CLINICAL SUPPORT (OUTPATIENT)
Dept: REHABILITATION | Facility: HOSPITAL | Age: 63
End: 2023-04-04
Payer: OTHER GOVERNMENT

## 2023-04-04 DIAGNOSIS — M25.662 DECREASED RANGE OF MOTION OF LEFT KNEE: Primary | ICD-10-CM

## 2023-04-04 DIAGNOSIS — R29.898 WEAKNESS OF LEFT LEG: ICD-10-CM

## 2023-04-04 DIAGNOSIS — M25.562 ACUTE PAIN OF LEFT KNEE: ICD-10-CM

## 2023-04-04 PROCEDURE — 97014 ELECTRIC STIMULATION THERAPY: CPT

## 2023-04-04 PROCEDURE — 97530 THERAPEUTIC ACTIVITIES: CPT

## 2023-04-04 PROCEDURE — 97140 MANUAL THERAPY 1/> REGIONS: CPT

## 2023-04-04 PROCEDURE — 97110 THERAPEUTIC EXERCISES: CPT

## 2023-04-04 NOTE — PROGRESS NOTES
[facility]   Physical Therapy Treatment Note     Name: Rayray Hutchins  Clinic Number: [id number]  Therapy Diagnosis: Left Knee Pain   Physician: AMADOR Muse  Visit Date: 4/4/2023  Physician Orders: PT Eval and Treat   Medical Diagnosis from Referral: Left Knee Pain     Evaluation Date: 11/28/2022  Re-Evaluation Date : 3/21/2023  Authorization Period Expiration: 7/15/2023  Plan of Care Expiration: 7/15/2023  Visit # / Visits authorized: 19 / 30   PTA VISIT # 2/5    Treatment Time In: 1:45 pm   Treatment Time Out: 2:50 pm   Total Appointment Time (timed & untimed codes): 60 minutes    Precautions: Standard     Functional Level at time of Evaluation:  Since the first knee surgery in 2019 he has had continued pain and dysfunction in the Left Knee     Subjective     Pt reports: continued pain in the Left Knee. He states yesterday was really bad for some reason.    He was compliant with home exercise program.    Pain: 7/10 upon arrival to Therapy; 9/10 with Stretches  Location: left knee      Objective     Rayray received therapeutic exercises to develop strength, endurance, ROM, and flexibility for 15 minutes including:    Knee Exercises  Bike  5 Min (seat #4 today)   Calf Stretch  5x20 seconds on slant board    Hamstring Stretch  5x20 seconds   Quad Stretch  8 x 15 sec at step   Cybex Leg Press - Bilateral  10 plates 3 x 10   Cybex Leg Press - Left Only   6 plates 3 x 10       Cybex Knee Extension  3 x 10 3 plates   Cybex Hamstring Curls  5 plates 3 x 10    Cybex Hip - Abduction bilateral   3 plates 20x   Cybex Hip - Flexion bilateral   3 plates 20x   Cybex Hip - Extension     Cable Knee TKE  5 plates 30x   Supine Ball Rolls into Flexion   30x (not performed today)    SL progressive bridges   3x10 (not performed today)    Wall Squats with Ball  20x         Prone Knee Flexion Stretch   4 x 30 sec hold with green strap         NK Table   Manual Therapy                                     Rayray received the following THERAPEUTIC ACTIVITIES to improve functional performance for 15 Minutes  Fwd Steps x 30  Lateral Step x 30    Rayray received the following manual therapy techniques: Joint mobilizations, Myofacial release, and Soft tissue Mobilization were applied to the: Left Knee for 15 minutes, including:    NK chair for progressive creep stretch - Knee Extension x 10 with 8.8 lbs followed by flexion stretch with over-pressure and 2 8.8 lbs plates x 10 Minutes for creep stretch     Rayray participated in neuromuscular re-education activities to improve: Balance, Coordination, Kinesthetic, and Proprioception for 0 minutes. The following activities were included:  Single Leg Stance on Left     Patient received the following supervised modalities after being cleared for contradictions: Pre-Mod Electrical Stimulation:  Patient received Pre-Mod Electrical Stimulation for pain control applied to the Left Knee. Pt received stimulation at a frequency of  Hz for 15 minutes. Patient tolerated treatment well without any adverse effects.       Home Exercises Provided and Patient Education Provided     Education provided: Reviewed his home exercise and stretching program and instructed him to stretch diligently at home    Written Home Exercises Provided: yes.  Exercises were reviewed and Rayray was able to demonstrate them prior to the end of the session.  Rayray demonstrated good  understanding of the education provided.     See EMR under Patient Instructions for exercises provided prior visit.    Assessment     86 degrees LLE active range of motion knee flexion on step   94 degrees after NK table     Patient is still awaiting approval for the JOSHUA splint. Left Knee continues to be swollen and painful. Knee is very stiff every day upon arrival even with patient stretching on his own at home. Therapist focusing on increasing strength and Left Knee Flexion Range of Motion. Therapist added front  and lateral step ups back to the Plan of Care today. He was able to tolerate this well, but requires rest breaks. We will continue with the current Plan of Care and advance as able.           Range of Motion/Strength :   Left Extremity on 3/28/2023                                                           Left Extremity on 1/302023  [facility] PROM Strength  Location  [facility]    PROM   Strength     86   96  4/5   Knee    Flexion (140)  86  93  4+/5     -5  0  4/5                Extension (0)   0     4+/5         PMH from the Evaluation : Rayray is a 62 y.o. male referred to outpatient Physical Therapy with a medical diagnosis of Left Knee Pain. Rayray reports: He underwent Left TKA July 2019, Revision October 2020, and he has undergone 4 different Left Knee Manipulation and Scar tissue removal procedures. Last Manipulation was in 2021. He is here today due to continued Left Knee Pain and dysfunction. Left Knee presents with Flexion contracture and Left Knee significantly larger than the Right Knee. Patient presents with decreased Left Knee Flexion Range of Motion with hard end feel at end range. Left Knee is much larger than the Right most likely due to build up of scar tissue. Patient has difficulty with sit to stand especially from low chair. He shifts his weight to the Right in order to stand. Patient is noted to have decreased stance time and antalgic gait on the Left. Patient has decreased heel strike and hits foot flat with initial contact on the Left. He tends to keep the knee slightly flexed throughout gait cycle. Patient has decreased step/stride length resulting in slow michelle.  Patient will require Physical Therapy Intervention to address all deficits and work toward completion of all goals set. Therapist will refer patient back to MD upon completion of Therapy for further assessment if pain and dysfunction persist.          Rayray Is progressing well towards his goals.   Pt prognosis is Good.     Pt will  continue to benefit from skilled outpatient physical therapy to address the deficits listed in the problem list box on initial evaluation, provide pt/family education and to maximize pt's level of independence in the home and community environment.     Pt's spiritual, cultural and educational needs considered and pt agreeable to plan of care and goals.    Anticipated Barriers for therapy: Long Standing Left Knee Flexion Contracture     Goals:  Short Term Goals: 6 weeks   1. Independent with Home Exercise Program   2. Increase Left Knee Range of Motion to 0 Degrees to 90 Degrees  3. Increase Left Knee Strength to grossly 4+/5  4. Patient will ambulate 500 feet with stance time more equal from Left to Right with complaints of pain Less than or Equal to 4/10.       Long Term Goals: 12 weeks   1. Patient will increase Left Knee Strength to grossly 5/5  2. Increase Left Knee Range of Motion to 0 Degrees to 100 Degrees  3. Patient will ambulate 1000+ feet with Good Heel strike on the Left with complaints of pain Less than or Equal to 3/10.      Plan     Plan of care Certification: 3/21/2023 to 7/15/2023.     Outpatient Physical Therapy 2 times weekly for 12 weeks to include the following interventions: Electrical Stimulation to increase strength and decrease pain and inflammation as neeeded, Gait Training, Manual Therapy, Moist Heat/ Ice, Neuromuscular Re-ed, Patient Education, Therapeutic Activities, Therapeutic Exercise, and Ultrasound.        RADHA GARCIA, PT, DPT     4/4/2023

## 2023-04-06 ENCOUNTER — CLINICAL SUPPORT (OUTPATIENT)
Dept: REHABILITATION | Facility: HOSPITAL | Age: 63
End: 2023-04-06
Payer: OTHER GOVERNMENT

## 2023-04-06 DIAGNOSIS — M25.662 DECREASED RANGE OF MOTION OF LEFT KNEE: Primary | ICD-10-CM

## 2023-04-06 DIAGNOSIS — R29.898 WEAKNESS OF LEFT LEG: ICD-10-CM

## 2023-04-06 DIAGNOSIS — M25.562 ACUTE PAIN OF LEFT KNEE: ICD-10-CM

## 2023-04-06 PROCEDURE — 97530 THERAPEUTIC ACTIVITIES: CPT | Mod: CQ

## 2023-04-06 PROCEDURE — 97110 THERAPEUTIC EXERCISES: CPT | Mod: CQ

## 2023-04-06 PROCEDURE — 97140 MANUAL THERAPY 1/> REGIONS: CPT | Mod: CQ

## 2023-04-06 NOTE — PROGRESS NOTES
[facility]   Physical Therapy Treatment Note     Name: Rayray Hutchins  Clinic Number: [id number]  Therapy Diagnosis: Left Knee Pain   Physician: AMADOR Muse  Visit Date: 4/6/2023  Physician Orders: PT Eval and Treat   Medical Diagnosis from Referral: Left Knee Pain     Evaluation Date: 11/28/2022  Re-Evaluation Date : 3/21/2023  Authorization Period Expiration: 7/15/2023  Plan of Care Expiration: 7/15/2023  Visit # / Visits authorized: 20 / 30   PTA VISIT # 1/5    Treatment Time In: 2:22 pm   Treatment Time Out: 3:15 pm   Total Appointment Time (timed & untimed codes): 53 minutes    Precautions: Standard     Functional Level at time of Evaluation:  Since the first knee surgery in 2019 he has had continued pain and dysfunction in the Left Knee     Subjective     Pt reports: continued stiffness and soreness in the Left Knee. The weather has his knee aching a little more today.     He was compliant with home exercise program.    Pain: 8/10 upon arrival to Therapy; 9/10 with Stretches  Location: left knee      Objective     Rayray received therapeutic exercises to develop strength, endurance, ROM, and flexibility for 25 minutes including:    Knee Exercises    Bike  5 Min (seat #4 today)   Calf Stretch  5x20 seconds on slant board    Hamstring Stretch  5x20 seconds   Quad Stretch  8 x 15 sec at step   Cybex Leg Press - Bilateral  10 plates 3 x 10   Cybex Leg Press - Left Only   6 plates 3 x 10       Cybex Knee Extension  2 x 10 4 plates   Cybex Hamstring Curls  5 plates 3 x 10    Cybex Hip - Abduction bilateral   3 plates 20x   Cybex Hip - Flexion bilateral   3 plates 20x   Cybex Hip - Extension     Cable Knee TKE  5 plates 30x   Supine Ball Rolls into Flexion   30x (not performed today)    SL progressive bridges   3x10 (not performed today)    Wall Squats with Ball  20x         Prone Knee Flexion Stretch   5 x 30 sec hold with green strap   Half kneeling knee flexion  stretch on foam pad  4 x 30 second hold     NK Table   Manual Therapy                                    Rayray received the following THERAPEUTIC ACTIVITIES to improve functional performance for 15 Minutes  Fwd Steps x 30 at 6 inch step   Lateral Step x 30 at 6 inch step     Ryaray received the following manual therapy techniques: Joint mobilizations, Myofacial release, and Soft tissue Mobilization were applied to the: Left Knee for 18 minutes, including:    Manual Prone Quad  NK chair for progressive creep stretch - Knee Extension x 10 with 8.8 lbs followed by flexion stretch with over-pressure and 2 8.8 lbs plates x 10 Minutes for creep stretch       Rayray participated in neuromuscular re-education activities to improve: Balance, Coordination, Kinesthetic, and Proprioception for 0 minutes. The following activities were included:  Single Leg Stance on Left     Patient received the following supervised modalities after being cleared for contradictions: Pre-Mod Electrical Stimulation:  Patient received Pre-Mod Electrical Stimulation for pain control applied to the Left Knee. Pt received stimulation at a frequency of  Hz for 0 minutes. Patient tolerated treatment well without any adverse effects.       Home Exercises Provided and Patient Education Provided     Education provided: Reviewed his home exercise and stretching program and instructed him to stretch diligently at home    Written Home Exercises Provided: yes.  Exercises were reviewed and Rayray was able to demonstrate them prior to the end of the session.  Rayray demonstrated good  understanding of the education provided.     See EMR under Patient Instructions for exercises provided prior visit.    Assessment   Left knee ROM measurements on 4/6/2023  86 degrees LLE active range of motion knee flexion on step   94 degrees after NK table       Patient is still awaiting approval for the JOSHUA splint. Left Knee continues to be swollen and painful. Knee is  very stiff every day upon arrival even with patient stretching on his own at home. Therapist focusing on increasing strength and Left Knee Flexion Range of Motion. Patient tolerated progressions today with Increased weight on cybex knee extension today to 4 plates. Therapist added front and lateral step ups back to the Plan of Care today. He was able to tolerate this well, but requires rest breaks. We will continue with the current Plan of Care and advance as able.           Range of Motion/Strength :   Left Extremity on 3/28/2023                                                           Left Extremity on 1/302023  [facility] PROM Strength  Location  [facility]    PROM   Strength     86   96  4/5   Knee    Flexion (140)  86  93  4+/5     -5  0  4/5                Extension (0)   0     4+/5         PMH from the Evaluation : Rayray is a 62 y.o. male referred to outpatient Physical Therapy with a medical diagnosis of Left Knee Pain. Rayray reports: He underwent Left TKA July 2019, Revision October 2020, and he has undergone 4 different Left Knee Manipulation and Scar tissue removal procedures. Last Manipulation was in 2021. He is here today due to continued Left Knee Pain and dysfunction. Left Knee presents with Flexion contracture and Left Knee significantly larger than the Right Knee. Patient presents with decreased Left Knee Flexion Range of Motion with hard end feel at end range. Left Knee is much larger than the Right most likely due to build up of scar tissue. Patient has difficulty with sit to stand especially from low chair. He shifts his weight to the Right in order to stand. Patient is noted to have decreased stance time and antalgic gait on the Left. Patient has decreased heel strike and hits foot flat with initial contact on the Left. He tends to keep the knee slightly flexed throughout gait cycle. Patient has decreased step/stride length resulting in slow michelle.  Patient will require Physical Therapy Intervention to  address all deficits and work toward completion of all goals set. Therapist will refer patient back to MD upon completion of Therapy for further assessment if pain and dysfunction persist.          Rayray Is progressing well towards his goals.   Pt prognosis is Good.     Pt will continue to benefit from skilled outpatient physical therapy to address the deficits listed in the problem list box on initial evaluation, provide pt/family education and to maximize pt's level of independence in the home and community environment.     Pt's spiritual, cultural and educational needs considered and pt agreeable to plan of care and goals.    Anticipated Barriers for therapy: Long Standing Left Knee Flexion Contracture     Goals:  Short Term Goals: 6 weeks   1. Independent with Home Exercise Program   2. Increase Left Knee Range of Motion to 0 Degrees to 90 Degrees  3. Increase Left Knee Strength to grossly 4+/5  4. Patient will ambulate 500 feet with stance time more equal from Left to Right with complaints of pain Less than or Equal to 4/10.       Long Term Goals: 12 weeks   1. Patient will increase Left Knee Strength to grossly 5/5  2. Increase Left Knee Range of Motion to 0 Degrees to 100 Degrees  3. Patient will ambulate 1000+ feet with Good Heel strike on the Left with complaints of pain Less than or Equal to 3/10.      Plan     Plan of care Certification: 3/21/2023 to 7/15/2023.     Outpatient Physical Therapy 2 times weekly for 12 weeks to include the following interventions: Electrical Stimulation to increase strength and decrease pain and inflammation as neeeded, Gait Training, Manual Therapy, Moist Heat/ Ice, Neuromuscular Re-ed, Patient Education, Therapeutic Activities, Therapeutic Exercise, and Ultrasound.        Lencho Forrester, PTA    4/6/2023

## 2023-04-14 ENCOUNTER — CLINICAL SUPPORT (OUTPATIENT)
Dept: REHABILITATION | Facility: HOSPITAL | Age: 63
End: 2023-04-14
Payer: OTHER GOVERNMENT

## 2023-04-14 DIAGNOSIS — M25.662 DECREASED RANGE OF MOTION OF LEFT KNEE: Primary | ICD-10-CM

## 2023-04-14 DIAGNOSIS — R29.898 WEAKNESS OF LEFT LEG: ICD-10-CM

## 2023-04-14 DIAGNOSIS — M25.562 ACUTE PAIN OF LEFT KNEE: ICD-10-CM

## 2023-04-14 PROCEDURE — 97110 THERAPEUTIC EXERCISES: CPT | Mod: CQ

## 2023-04-14 PROCEDURE — 97140 MANUAL THERAPY 1/> REGIONS: CPT | Mod: CQ

## 2023-04-14 PROCEDURE — 97530 THERAPEUTIC ACTIVITIES: CPT | Mod: CQ

## 2023-04-14 NOTE — PROGRESS NOTES
[facility]   Physical Therapy Treatment Note     Name: Rayray Hutchins  Clinic Number: [id number]  Therapy Diagnosis: Left Knee Pain   Physician: AMADOR Muse  Visit Date: 4/14/2023  Physician Orders: PT Eval and Treat   Medical Diagnosis from Referral: Left Knee Pain     Evaluation Date: 11/28/2022  Re-Evaluation Date : 3/21/2023  Authorization Period Expiration: 7/15/2023  Plan of Care Expiration: 7/15/2023  Visit # / Visits authorized: 21 / 30   PTA VISIT # 2/5    Treatment Time In: 09:47 am  Treatment Time Out: 10:50 am   Total Appointment Time (timed & untimed codes): 63 minutes    Precautions: Standard     Functional Level at time of Evaluation:  Since the first knee surgery in 2019 he has had continued pain and dysfunction in the Left Knee     Subjective     Pt reports: continued stiffness in the Left Knee upon arrival today, Patient reports he is surprised the knee is not really hurting today, it is just very stiff.     He was compliant with home exercise program.    Pain: 0/10 upon arrival to Therapy; 7/10 with Stretches  Location: left knee      Objective     Rayray received therapeutic exercises to develop strength, endurance, ROM, and flexibility for 25 minutes including:    Knee Exercises  Bike  5 Min (seat #4 today)   Calf Stretch  5x20 seconds on slant board    Hamstring Stretch  5x20 seconds   Quad Stretch  8 x 15 sec at step   Cybex Leg Press - Bilateral  10 plates 3 x 10   Cybex Leg Press - Left Only   6 plates 3 x 10       Cybex Knee Extension  3 x 10 4 plates   Cybex Hamstring Curls  5 plates 3 x 10    Cybex Hip - Abduction bilateral   3 plates 20x   Cybex Hip - Flexion bilateral   3 plates 20x   Cybex Hip - Extension     Cable Knee TKE  5 plates 30x   Supine Ball Rolls into Flexion   30x (not performed today)    SL progressive bridges   3x10 (not performed today)    Wall Squats with Ball  20x             Half kneeling knee flexion stretch on foam pad   10 x 10 second hold     NK Table   Manual Therapy                                    Rayray received the following THERAPEUTIC ACTIVITIES to improve functional performance for 13 Minutes  Fwd Steps x 30 at 6 inch step   Lateral Step downs x 30 at 6 inch step     Rayray received the following manual therapy techniques: Joint mobilizations, Myofacial release, and Soft tissue Mobilization were applied to the: Left Knee for 25 minutes, including:    Manual Prone Quad  NK chair for progressive creep stretch - open chain Knee Extension x 10 with 8.8 lbs followed by flexion stretch with over-pressure and (1) 8.8 lb, (1) 14.3lb plates x 10 Minutes for creep stretch (2 rounds)      Rayray participated in neuromuscular re-education activities to improve: Balance, Coordination, Kinesthetic, and Proprioception for 0 minutes. The following activities were included:  Single Leg Stance on Left     Patient received the following supervised modalities after being cleared for contradictions: Pre-Mod Electrical Stimulation:  Patient received Pre-Mod Electrical Stimulation for pain control applied to the Left Knee. Pt received stimulation at a frequency of  Hz for 0 minutes. Patient tolerated treatment well without any adverse effects.       Home Exercises Provided and Patient Education Provided     Education provided: Reviewed his home exercise and stretching program and instructed him to stretch diligently at home    Written Home Exercises Provided: yes.  Exercises were reviewed and Rayray was able to demonstrate them prior to the end of the session.  Rayray demonstrated good  understanding of the education provided.     See EMR under Patient Instructions for exercises provided prior visit.    Assessment   Left knee ROM measurements on 4/14/2023  83 degrees LLE active range of motion knee flexion on step   95 degrees after NK table     Patient is still awaiting approval for the JOSHUA splint. Left Knee continues to be swollen and  painful. Knee is very stiff every day upon arrival, as Patient has not been here since 4/6 and had to cancel earlier this week due to having a doctor's appt. Therapist focusing on increasing strength and Left Knee Flexion Range of Motion with extensive manual stretching utilizing creep stretch at NK table. Patient session today with lower extremity fatigue noted at conclusion. Patient does require occasional rest breaks during sessions due to the LLE weakness and fatigue with eccentric focused quad exercises. We will continue with the current Plan of Care and advance as able once he returns.     Range of Motion/Strength :   Left Extremity on 3/28/2023                                                           Left Extremity on 1/302023  [facility] PROM Strength  Location  AROM    PROM   Strength     86   96  4/5   Knee    Flexion (140)  86  93  4+/5     -5  0  4/5                Extension (0)   0     4+/5         PMH from the Evaluation : Rayray is a 62 y.o. male referred to outpatient Physical Therapy with a medical diagnosis of Left Knee Pain. Rayray reports: He underwent Left TKA July 2019, Revision October 2020, and he has undergone 4 different Left Knee Manipulation and Scar tissue removal procedures. Last Manipulation was in 2021. He is here today due to continued Left Knee Pain and dysfunction. Left Knee presents with Flexion contracture and Left Knee significantly larger than the Right Knee. Patient presents with decreased Left Knee Flexion Range of Motion with hard end feel at end range. Left Knee is much larger than the Right most likely due to build up of scar tissue. Patient has difficulty with sit to stand especially from low chair. He shifts his weight to the Right in order to stand. Patient is noted to have decreased stance time and antalgic gait on the Left. Patient has decreased heel strike and hits foot flat with initial contact on the Left. He tends to keep the knee slightly flexed throughout gait cycle.  Patient has decreased step/stride length resulting in slow michelle.  Patient will require Physical Therapy Intervention to address all deficits and work toward completion of all goals set. Therapist will refer patient back to MD upon completion of Therapy for further assessment if pain and dysfunction persist.          Rayray Is progressing well towards his goals.   Pt prognosis is Good.     Pt will continue to benefit from skilled outpatient physical therapy to address the deficits listed in the problem list box on initial evaluation, provide pt/family education and to maximize pt's level of independence in the home and community environment.     Pt's spiritual, cultural and educational needs considered and pt agreeable to plan of care and goals.    Anticipated Barriers for therapy: Long Standing Left Knee Flexion Contracture     Goals:  Short Term Goals: 6 weeks   1. Independent with Home Exercise Program   2. Increase Left Knee Range of Motion to 0 Degrees to 90 Degrees  3. Increase Left Knee Strength to grossly 4+/5  4. Patient will ambulate 500 feet with stance time more equal from Left to Right with complaints of pain Less than or Equal to 4/10.       Long Term Goals: 12 weeks   1. Patient will increase Left Knee Strength to grossly 5/5  2. Increase Left Knee Range of Motion to 0 Degrees to 100 Degrees  3. Patient will ambulate 1000+ feet with Good Heel strike on the Left with complaints of pain Less than or Equal to 3/10.      Plan     Plan of care Certification: 3/21/2023 to 7/15/2023.     Outpatient Physical Therapy 2 times weekly for 12 weeks to include the following interventions: Electrical Stimulation to increase strength and decrease pain and inflammation as neeeded, Gait Training, Manual Therapy, Moist Heat/ Ice, Neuromuscular Re-ed, Patient Education, Therapeutic Activities, Therapeutic Exercise, and Ultrasound.        Lencho Forrester, PTA    4/14/2023

## 2023-04-18 ENCOUNTER — CLINICAL SUPPORT (OUTPATIENT)
Dept: REHABILITATION | Facility: HOSPITAL | Age: 63
End: 2023-04-18
Payer: OTHER GOVERNMENT

## 2023-04-18 DIAGNOSIS — R29.898 WEAKNESS OF LEFT LEG: ICD-10-CM

## 2023-04-18 DIAGNOSIS — M25.662 DECREASED RANGE OF MOTION OF LEFT KNEE: Primary | ICD-10-CM

## 2023-04-18 DIAGNOSIS — M25.562 ACUTE PAIN OF LEFT KNEE: ICD-10-CM

## 2023-04-18 PROCEDURE — 97110 THERAPEUTIC EXERCISES: CPT

## 2023-04-18 PROCEDURE — 97530 THERAPEUTIC ACTIVITIES: CPT

## 2023-04-18 PROCEDURE — 97140 MANUAL THERAPY 1/> REGIONS: CPT

## 2023-04-18 NOTE — PROGRESS NOTES
[facility] OUTPATIENT THERAPY AND WELLNESS   Physical Therapy Treatment Note     Name: Rayray Hutchins  Clinic Number: [id number]  Therapy Diagnosis: Left Knee Pain   Physician: AMADOR Muse  Visit Date: 4/18/2023  Physician Orders: PT Eval and Treat   Medical Diagnosis from Referral: Left Knee Pain     Evaluation Date: 11/28/2022  Re-Evaluation Date : 3/21/2023  Authorization Period Expiration: 7/15/2023  Plan of Care Expiration: 7/15/2023  Visit # / Visits authorized: 22 / 30   PTA VISIT # 2/5    Treatment Time In: 1:55 pm  Treatment Time Out: 2:52 am   Total Appointment Time (timed & untimed codes): 55 minutes    Precautions: Standard     Functional Level at time of Evaluation:  Since the first knee surgery in 2019 he has had continued pain and dysfunction in the Left Knee     Subjective     Pt reports: he is stretching the knee daily at home. This does cause increased soreness     He was compliant with home exercise program.    Pain: 0/10 upon arrival to Therapy; 7/10 with Stretches  Location: left knee      Objective     Rayray received therapeutic exercises to develop strength, endurance, ROM, and flexibility for 30 minutes including:    Knee Exercises  Bike  5 Min (seat #4 today)   Calf Stretch  5x20 seconds on slant board    Hamstring Stretch  5x20 seconds   Quad Stretch  8 x 15 sec at step   Cybex Leg Press - Bilateral  10 plates 3 x 10   Cybex Leg Press - Left Only   6 plates 3 x 10       Cybex Knee Extension  3 x 10 4 plates   Cybex Hamstring Curls  5 plates 3 x 10    Cybex Hip - Abduction bilateral   3 plates 20x   Cybex Hip - Flexion bilateral   3 plates 20x   Cybex Hip - Extension     Cable Knee TKE  5 plates 30x   Supine Ball Rolls into Flexion   30x (not performed today)    SL progressive bridges   3x10 (not performed today)    Wall Squats with Ball  20x             Half kneeling knee flexion stretch on foam pad  10 x 10 second hold     NK Table   Manual Therapy                                     Rayray received the following THERAPEUTIC ACTIVITIES to improve functional performance for 10 Minutes  Fwd Steps x 30 at 6 inch step   Lateral Step downs x 30 at 6 inch step     Rayray received the following manual therapy techniques: Joint mobilizations, Myofacial release, and Soft tissue Mobilization were applied to the: Left Knee for 15 minutes, including:    Manual Prone Quad  NK chair for progressive creep stretch - open chain Knee Extension x 10 with 8.8 lbs followed by flexion stretch with over-pressure and (1) 8.8 lb, (1) 14.3lb plates x 10 Minutes for creep stretch (2 rounds)      Rayray participated in neuromuscular re-education activities to improve: Balance, Coordination, Kinesthetic, and Proprioception for 0 minutes. The following activities were included:  Single Leg Stance on Left     Patient received the following supervised modalities after being cleared for contradictions: Pre-Mod Electrical Stimulation:  Patient received Pre-Mod Electrical Stimulation for pain control applied to the Left Knee. Pt received stimulation at a frequency of  Hz for 0 minutes. Patient tolerated treatment well without any adverse effects.       Home Exercises Provided and Patient Education Provided     Education provided: Reviewed his home exercise and stretching program and instructed him to stretch diligently at home    Written Home Exercises Provided: yes.  Exercises were reviewed and Rayray was able to demonstrate them prior to the end of the session.  Rayray demonstrated good  understanding of the education provided.     See EMR under Patient Instructions for exercises provided prior visit.    Assessment   Left knee ROM measurements on 4/18/2023  83 degrees LLE active range of motion knee flexion on step   95 degrees after NK table     Patient continues to have swelling and pain in the Left Knee. Therapist is working on strengthening and Range of Motion to try to increase his motion to a more  functional range. That Left Knee continues to be much larger than the Right. He has excess scar tissue from years of trauma and surgeries to that knee. This makes his mobility very limited. We have requested a JOSHUA splint to help with dynamic stretching at home but the [facility] has not [facility] this at this time. We will continue to work with patient to progress him as able. Sup Visit performed today with IZABEL Perez and IZABEL Jackson.  All goals and treatment plan reviewed. Will work toward completion of all goals set.         Range of Motion/Strength :   Left Extremity on 3/28/2023                                                           Left Extremity on 1/302023  AROM PROM Strength  Location  AROM    PROM   Strength     86   96  4/5   Knee    Flexion (140)  86  93  4+/5     -5  0  4/5                Extension (0)   0     4+/5         PMH from the Evaluation : Rayray is a 62 y.o. male referred to outpatient Physical Therapy with a medical diagnosis of Left Knee Pain. Rayray reports: He underwent Left TKA July 2019, Revision October 2020, and he has undergone 4 different Left Knee Manipulation and Scar tissue removal procedures. Last Manipulation was in 2021. He is here today due to continued Left Knee Pain and dysfunction. Left Knee presents with Flexion contracture and Left Knee significantly larger than the Right Knee. Patient presents with decreased Left Knee Flexion Range of Motion with hard end feel at end range. Left Knee is much larger than the Right most likely due to build up of scar tissue. Patient has difficulty with sit to stand especially from low chair. He shifts his weight to the Right in order to stand. Patient is noted to have decreased stance time and antalgic gait on the Left. Patient has decreased heel strike and hits foot flat with initial contact on the Left. He tends to keep the knee slightly flexed throughout gait cycle. Patient has decreased step/stride length resulting in slow  michelle.  Patient will require Physical Therapy Intervention to address all deficits and work toward completion of all goals set. Therapist will refer patient back to MD upon completion of Therapy for further assessment if pain and dysfunction persist.          Rayray Is progressing well towards his goals.   Pt prognosis is Good.     Pt will continue to benefit from skilled outpatient physical therapy to address the deficits listed in the problem list box on initial evaluation, provide pt/family education and to maximize pt's level of independence in the home and community environment.     Pt's spiritual, cultural and educational needs considered and pt agreeable to plan of care and goals.    Anticipated Barriers for therapy: Long Standing Left Knee Flexion Contracture     Goals:  Short Term Goals: 6 weeks   1. Independent with Home Exercise Program   2. Increase Left Knee Range of Motion to 0 Degrees to 90 Degrees  3. Increase Left Knee Strength to grossly 4+/5  4. Patient will ambulate 500 feet with stance time more equal from Left to Right with complaints of pain Less than or Equal to 4/10.       Long Term Goals: 12 weeks   1. Patient will increase Left Knee Strength to grossly 5/5  2. Increase Left Knee Range of Motion to 0 Degrees to 100 Degrees  3. Patient will ambulate 1000+ feet with Good Heel strike on the Left with complaints of pain Less than or Equal to 3/10.      Plan     Plan of care Certification: 3/21/2023 to 7/15/2023.     Outpatient Physical Therapy 2 times weekly for 12 weeks to include the following interventions: Electrical Stimulation to increase strength and decrease pain and inflammation as neeeded, Gait Training, Manual Therapy, Moist Heat/ Ice, Neuromuscular Re-ed, Patient Education, Therapeutic Activities, Therapeutic Exercise, and Ultrasound.        RADHA GARCIA, PT, DPT    4/18/2023

## 2023-04-21 ENCOUNTER — CLINICAL SUPPORT (OUTPATIENT)
Dept: REHABILITATION | Facility: HOSPITAL | Age: 63
End: 2023-04-21
Payer: OTHER GOVERNMENT

## 2023-04-21 DIAGNOSIS — R29.898 WEAKNESS OF LEFT LEG: ICD-10-CM

## 2023-04-21 DIAGNOSIS — M25.662 DECREASED RANGE OF MOTION OF LEFT KNEE: Primary | ICD-10-CM

## 2023-04-21 DIAGNOSIS — M25.562 ACUTE PAIN OF LEFT KNEE: ICD-10-CM

## 2023-04-21 PROCEDURE — 97530 THERAPEUTIC ACTIVITIES: CPT | Mod: CQ

## 2023-04-21 PROCEDURE — 97140 MANUAL THERAPY 1/> REGIONS: CPT | Mod: CQ

## 2023-04-21 PROCEDURE — 97110 THERAPEUTIC EXERCISES: CPT | Mod: CQ

## 2023-04-21 NOTE — PROGRESS NOTES
[facility] OUTPATIENT THERAPY AND WELLNESS   Physical Therapy Treatment Note     Name: Rayray Hutchins  Clinic Number: [id number]  Therapy Diagnosis: Left Knee Pain   Physician: AMADOR Muse  Visit Date: 4/21/2023  Physician Orders: PT Eval and Treat   Medical Diagnosis from Referral: Left Knee Pain     Evaluation Date: 11/28/2022  Re-Evaluation Date : 3/21/2023  Authorization Period Expiration: 7/15/2023  Plan of Care Expiration: 7/15/2023  Visit # / Visits authorized: 23 / 30   PTA VISIT # 1/5    Treatment Time In: 09:58 am  Treatment Time Out: 10:52 am  Total Appointment Time (timed & untimed codes): 54 minutes    Precautions: Standard     Functional Level at time of Evaluation:  Since the first knee surgery in 2019 he has had continued pain and dysfunction in the Left Knee     Subjective     Pt reports: he is sore today coming in. Still working hard on stretching the knee at home.     He was compliant with home exercise program.    Pain: 5/10 upon arrival to Therapy; 7/10 with Stretches  Location: left knee      Objective     Rayray received therapeutic exercises to develop strength, endurance, ROM, and flexibility for 30 minutes including:    Knee Exercises  Bike  5 Min (seat #4 today)   Calf Stretch  5x20 seconds on slant board    Hamstring Stretch  5x20 seconds   Quad Stretch  8 x 15 sec at step   Cybex Leg Press - Bilateral  10 plates 3 x 10   Cybex Leg Press - Left Only   6 plates 3 x 10       Cybex Knee Extension  2 x 10 5 plates   Cybex Hamstring Curls  5 plates 3 x 10    Cybex Hip - Abduction bilateral   3 plates 20x   Cybex Hip - Flexion bilateral   3 plates 20x   Cybex Hip - Extension     Cable Knee TKE  5 plates 30x   Supine Ball Rolls into Flexion   30x (not performed today)    SL progressive bridges   3x10 (not performed today)    Wall Squats with Ball  20x             Half kneeling knee flexion stretch on foam pad  10 x 10 second hold     NK Table   Manual Therapy                                     Rayray received the following THERAPEUTIC ACTIVITIES to improve functional performance for 10 Minutes  Fwd Steps x 30 at 6 inch step   Lateral Step downs x 30 at 6 inch step     Rayray received the following manual therapy techniques: Joint mobilizations, Myofacial release, and Soft tissue Mobilization were applied to the: Left Knee for 14 minutes, including:    Manual Prone Quad  NK chair for progressive creep stretch - open chain Knee Extension x 10 with 8.8 lbs followed by flexion stretch with over-pressure and (1) 8.8 lb, (1) 14.3lb plates x 10 Minutes for creep stretch (2 rounds)      Rayray participated in neuromuscular re-education activities to improve: Balance, Coordination, Kinesthetic, and Proprioception for 0 minutes. The following activities were included:  Single Leg Stance on Left     Patient received the following supervised modalities after being cleared for contradictions: Pre-Mod Electrical Stimulation:  Patient received Pre-Mod Electrical Stimulation for pain control applied to the Left Knee. Pt received stimulation at a frequency of  Hz for 0 minutes. Patient tolerated treatment well without any adverse effects.       Home Exercises Provided and Patient Education Provided     Education provided: Reviewed his home exercise and stretching program and instructed him to stretch diligently at home    Written Home Exercises Provided: yes.  Exercises were reviewed and Rayray was able to demonstrate them prior to the end of the session.  Rayray demonstrated good  understanding of the education provided.     See EMR under Patient Instructions for exercises provided prior visit.    Assessment   Left knee ROM measurements on 4/21/2023  84 degrees LLE active range of motion knee flexion on step   95 degrees after NK table     Patient is still working on stretching the knee at home but he also continues to have swelling and pain in the Left Knee. Therapist is focusing on extensive  strengthening and manual Range of Motion techniques to try to increase his motion to a more functional range.  We have requested a JOSHUA splint to help with dynamic stretching at home but the [facility] has not [facility] this at this time, we are awaiting that.  We will continue to work with patient to progress him as able.       Range of Motion/Strength :   Left Extremity on 3/28/2023                                                           Left Extremity on 1/302023  AROM PROM Strength  Location  AROM    PROM   Strength     86   96  4/5   Knee    Flexion (140)  86  93  4+/5     -5  0  4/5                Extension (0)   0     4+/5         PMH from the Evaluation : Rayray is a 62 y.o. male referred to outpatient Physical Therapy with a medical diagnosis of Left Knee Pain. Rayray reports: He underwent Left TKA July 2019, Revision October 2020, and he has undergone 4 different Left Knee Manipulation and Scar tissue removal procedures. Last Manipulation was in 2021. He is here today due to continued Left Knee Pain and dysfunction. Left Knee presents with Flexion contracture and Left Knee significantly larger than the Right Knee. Patient presents with decreased Left Knee Flexion Range of Motion with hard end feel at end range. Left Knee is much larger than the Right most likely due to build up of scar tissue. Patient has difficulty with sit to stand especially from low chair. He shifts his weight to the Right in order to stand. Patient is noted to have decreased stance time and antalgic gait on the Left. Patient has decreased heel strike and hits foot flat with initial contact on the Left. He tends to keep the knee slightly flexed throughout gait cycle. Patient has decreased step/stride length resulting in slow michelle.  Patient will require Physical Therapy Intervention to address all deficits and work toward completion of all goals set. Therapist will refer patient back to MD upon completion of Therapy for further assessment if  pain and dysfunction persist.          Rayray Is progressing well towards his goals.   Pt prognosis is Good.     Pt will continue to benefit from skilled outpatient physical therapy to address the deficits listed in the problem list box on initial evaluation, provide pt/family education and to maximize pt's level of independence in the home and community environment.     Pt's spiritual, cultural and educational needs considered and pt agreeable to plan of care and goals.    Anticipated Barriers for therapy: Long Standing Left Knee Flexion Contracture     Goals:  Short Term Goals: 6 weeks   1. Independent with Home Exercise Program   2. Increase Left Knee Range of Motion to 0 Degrees to 90 Degrees  3. Increase Left Knee Strength to grossly 4+/5  4. Patient will ambulate 500 feet with stance time more equal from Left to Right with complaints of pain Less than or Equal to 4/10.       Long Term Goals: 12 weeks   1. Patient will increase Left Knee Strength to grossly 5/5  2. Increase Left Knee Range of Motion to 0 Degrees to 100 Degrees  3. Patient will ambulate 1000+ feet with Good Heel strike on the Left with complaints of pain Less than or Equal to 3/10.      Plan     Plan of care Certification: 3/21/2023 to 7/15/2023.     Outpatient Physical Therapy 2 times weekly for 12 weeks to include the following interventions: Electrical Stimulation to increase strength and decrease pain and inflammation as neeeded, Gait Training, Manual Therapy, Moist Heat/ Ice, Neuromuscular Re-ed, Patient Education, Therapeutic Activities, Therapeutic Exercise, and Ultrasound.        Lencho Forrester, PTA    4/21/2023

## 2023-04-25 ENCOUNTER — CLINICAL SUPPORT (OUTPATIENT)
Dept: REHABILITATION | Facility: HOSPITAL | Age: 63
End: 2023-04-25
Payer: OTHER GOVERNMENT

## 2023-04-25 DIAGNOSIS — M25.562 ACUTE PAIN OF LEFT KNEE: ICD-10-CM

## 2023-04-25 DIAGNOSIS — M25.662 DECREASED RANGE OF MOTION OF LEFT KNEE: Primary | ICD-10-CM

## 2023-04-25 DIAGNOSIS — R29.898 WEAKNESS OF LEFT LEG: ICD-10-CM

## 2023-04-25 NOTE — PROGRESS NOTES
[facility] THERAP[facility]   Physical Therapy Treatment Note     Name: Rayray Hutchins  Clinic Number: [id number]  Therapy Diagnosis: Left Knee Pain   Physician: AMADOR Muse  Visit Date: 4/25/2023  Physician Orders: PT Eval and Treat   Medical Diagnosis from Referral: Left Knee Pain     Evaluation Date: 11/28/2022  Re-Evaluation Date : 3/21/2023  Authorization Period Expiration: 7/15/2023  Plan of Care Expiration: 7/15/2023  Visit # / Visits authorized: 23 / 30   PTA VISIT # 1/5    Treatment Time In: 09:58 am  Treatment Time Out: 10:52 am  Total Appointment Time (timed & untimed codes): 54 minutes    Precautions: Standard     Functional Level at time of Evaluation:  Since the first knee surgery in 2019 he has had continued pain and dysfunction in the Left Knee     Subjective     Pt reports: he is sore today coming in. Still working hard on stretching the knee at home.     He was compliant with home exercise program.    Pain: 5/10 upon arrival to Therapy; 7/10 with Stretches  Location: left knee      Objective     Rayray received therapeutic exercises to develop strength, endurance, ROM, and flexibility for 30 minutes including:    Knee Exercises  Bike  5 Min (seat #4 today)   Calf Stretch  5x20 seconds on slant board    Hamstring Stretch  5x20 seconds   Quad Stretch  8 x 15 sec at step   Cybex Leg Press - Bilateral  10 plates 3 x 10   Cybex Leg Press - Left Only   6 plates 3 x 10       Cybex Knee Extension  2 x 10 5 plates   Cybex Hamstring Curls  5 plates 3 x 10    Cybex Hip - Abduction bilateral   3 plates 20x   Cybex Hip - Flexion bilateral   3 plates 20x   Cybex Hip - Extension     Cable Knee TKE  5 plates 30x   Supine Ball Rolls into Flexion   30x (not performed today)    SL progressive bridges   3x10 (not performed today)    Wall Squats with Ball  20x             Half kneeling knee flexion stretch on foam pad  10 x 10 second hold     NK Table   Manual Therapy                                     Rayray received the following THERAPEUTIC ACTIVITIES to improve functional performance for 10 Minutes  Fwd Steps x 30 at 6 inch step   Lateral Step downs x 30 at 6 inch step     Rayray received the following manual therapy techniques: Joint mobilizations, Myofacial release, and Soft tissue Mobilization were applied to the: Left Knee for 14 minutes, including:    Manual Prone Quad  NK chair for progressive creep stretch - open chain Knee Extension x 10 with 8.8 lbs followed by flexion stretch with over-pressure and (1) 8.8 lb, (1) 14.3lb plates x 10 Minutes for creep stretch (2 rounds)      Rayray participated in neuromuscular re-education activities to improve: Balance, Coordination, Kinesthetic, and Proprioception for 0 minutes. The following activities were included:  Single Leg Stance on Left     Patient received the following supervised modalities after being cleared for contradictions: Pre-Mod Electrical Stimulation:  Patient received Pre-Mod Electrical Stimulation for pain control applied to the Left Knee. Pt received stimulation at a frequency of  Hz for 0 minutes. Patient tolerated treatment well without any adverse effects.       Home Exercises Provided and Patient Education Provided     Education provided: Reviewed his home exercise and stretching program and instructed him to stretch diligently at home    Written Home Exercises Provided: yes.  Exercises were reviewed and Rayray was able to demonstrate them prior to the end of the session.  Rayray demonstrated good  understanding of the education provided.     See EMR under Patient Instructions for exercises provided prior visit.    Assessment   Left knee ROM measurements on 4/25/2023  84 degrees LLE active range of motion knee flexion on step   95 degrees after NK table     Patient is still working on stretching the knee at home but he also continues to have swelling and pain in the Left Knee. Therapist is focusing on extensive  strengthening and manual Range of Motion techniques to try to increase his motion to a more functional range.  We have requested a JOSHUA splint to help with dynamic stretching at home but the [facility] has not [facility] this at this time, we are awaiting that.  We will continue to work with patient to progress him as able.       Range of Motion/Strength :   Left Extremity on 3/28/2023                                                           Left Extremity on 1/302023  AROM PROM Strength  Location  AROM    PROM   Strength     86   96  4/5   Knee    Flexion (140)  86  93  4+/5     -5  0  4/5                Extension (0)   0     4+/5         PMH from the Evaluation : Rayray is a 62 y.o. male referred to outpatient Physical Therapy with a medical diagnosis of Left Knee Pain. Rayray reports: He underwent Left TKA July 2019, Revision October 2020, and he has undergone 4 different Left Knee Manipulation and Scar tissue removal procedures. Last Manipulation was in 2021. He is here today due to continued Left Knee Pain and dysfunction. Left Knee presents with Flexion contracture and Left Knee significantly larger than the Right Knee. Patient presents with decreased Left Knee Flexion Range of Motion with hard end feel at end range. Left Knee is much larger than the Right most likely due to build up of scar tissue. Patient has difficulty with sit to stand especially from low chair. He shifts his weight to the Right in order to stand. Patient is noted to have decreased stance time and antalgic gait on the Left. Patient has decreased heel strike and hits foot flat with initial contact on the Left. He tends to keep the knee slightly flexed throughout gait cycle. Patient has decreased step/stride length resulting in slow michelle.  Patient will require Physical Therapy Intervention to address all deficits and work toward completion of all goals set. Therapist will refer patient back to MD upon completion of Therapy for further assessment if  pain and dysfunction persist.          Rayray Is progressing well towards his goals.   Pt prognosis is Good.     Pt will continue to benefit from skilled outpatient physical therapy to address the deficits listed in the problem list box on initial evaluation, provide pt/family education and to maximize pt's level of independence in the home and community environment.     Pt's spiritual, cultural and educational needs considered and pt agreeable to plan of care and goals.    Anticipated Barriers for therapy: Long Standing Left Knee Flexion Contracture     Goals:  Short Term Goals: 6 weeks   1. Independent with Home Exercise Program   2. Increase Left Knee Range of Motion to 0 Degrees to 90 Degrees  3. Increase Left Knee Strength to grossly 4+/5  4. Patient will ambulate 500 feet with stance time more equal from Left to Right with complaints of pain Less than or Equal to 4/10.       Long Term Goals: 12 weeks   1. Patient will increase Left Knee Strength to grossly 5/5  2. Increase Left Knee Range of Motion to 0 Degrees to 100 Degrees  3. Patient will ambulate 1000+ feet with Good Heel strike on the Left with complaints of pain Less than or Equal to 3/10.      Plan     Plan of care Certification: 3/21/2023 to 7/15/2023.     Outpatient Physical Therapy 2 times weekly for 12 weeks to include the following interventions: Electrical Stimulation to increase strength and decrease pain and inflammation as neeeded, Gait Training, Manual Therapy, Moist Heat/ Ice, Neuromuscular Re-ed, Patient Education, Therapeutic Activities, Therapeutic Exercise, and Ultrasound.        RADHA GARCIA, PT, DPT     4/25/2023

## 2023-04-28 ENCOUNTER — CLINICAL SUPPORT (OUTPATIENT)
Dept: REHABILITATION | Facility: HOSPITAL | Age: 63
End: 2023-04-28
Payer: OTHER GOVERNMENT

## 2023-04-28 DIAGNOSIS — R29.898 WEAKNESS OF LEFT LEG: ICD-10-CM

## 2023-04-28 DIAGNOSIS — M25.662 DECREASED RANGE OF MOTION OF LEFT KNEE: Primary | ICD-10-CM

## 2023-04-28 DIAGNOSIS — M25.562 ACUTE PAIN OF LEFT KNEE: ICD-10-CM

## 2023-04-28 PROCEDURE — 97110 THERAPEUTIC EXERCISES: CPT

## 2023-04-28 PROCEDURE — 97140 MANUAL THERAPY 1/> REGIONS: CPT

## 2023-04-28 PROCEDURE — 97530 THERAPEUTIC ACTIVITIES: CPT

## 2023-04-28 NOTE — PROGRESS NOTES
[facility]   Physical Therapy Treatment Note     Name: Rayary Hutchins  Clinic Number: [id number]  Therapy Diagnosis: Left Knee Pain   Physician: AMADOR Muse  Visit Date: 4/28/2023  Physician Orders: PT Eval and Treat   Medical Diagnosis from Referral: Left Knee Pain     Evaluation Date: 11/28/2022  Re-Evaluation Date : 3/21/2023  Authorization Period Expiration: 7/15/2023  Plan of Care Expiration: 7/15/2023  Visit # / Visits authorized: 24 / 30   PTA VISIT # 1/5    Treatment Time In: 10:00 am  Treatment Time Out: 10:55 am  Total Appointment Time (timed & untimed codes): 54 minutes    Precautions: Standard     Functional Level at time of Evaluation:  Since the first knee surgery in 2019 he has had continued pain and dysfunction in the Left Knee     Subjective     Pt reports: his knee is swollen and stiff.     He was compliant with home exercise program.    Pain: 5/10 upon arrival to Therapy; 7/10 with Stretches  Location: left knee      Objective     Rayray received therapeutic exercises to develop strength, endurance, ROM, and flexibility for 30 minutes including:    Knee Exercises  Bike  5 Min (seat #4 today)   Calf Stretch  5x20 seconds on slant board    Hamstring Stretch  5x20 seconds   Quad Stretch  8 x 15 sec at step   Cybex Leg Press - Bilateral  10 plates 3 x 10   Cybex Leg Press - Left Only   6 plates 3 x 10       Cybex Knee Extension  3 x 10 5 plates   Cybex Hamstring Curls  5 plates 3 x 10    Cybex Hip - Abduction bilateral   3 plates 20x   Cybex Hip - Flexion bilateral   3 plates 20x   Cybex Hip - Extension     Cable Knee TKE  5 plates 30x   Supine Ball Rolls into Flexion   30x (not performed today)    SL progressive bridges   3x10 (not performed today)    Wall Squats with Ball  20x             Half kneeling knee flexion stretch on foam pad  10 x 10 second hold     NK Table   Manual Therapy                                    Rayray received the following  THERAPEUTIC ACTIVITIES to improve functional performance for 10 Minutes  Fwd Steps x 30 at 6 inch step   Lateral Step downs x 30 at 6 inch step     Rayray received the following manual therapy techniques: Joint mobilizations, Myofacial release, and Soft tissue Mobilization were applied to the: Left Knee for 14 minutes, including:    Manual Prone Quad  NK chair for progressive creep stretch - open chain Knee Extension x 10 with 8.8 lbs followed by flexion stretch with over-pressure and (1) 8.8 lb, (1) 14.3lb plates x 10 Minutes for creep stretch (2 rounds)      Rayray participated in neuromuscular re-education activities to improve: Balance, Coordination, Kinesthetic, and Proprioception for 0 minutes. The following activities were included:  Single Leg Stance on Left     Patient received the following supervised modalities after being cleared for contradictions: Pre-Mod Electrical Stimulation:  Patient received Pre-Mod Electrical Stimulation for pain control applied to the Left Knee. Pt received stimulation at a frequency of  Hz for 0 minutes. Patient tolerated treatment well without any adverse effects.       Home Exercises Provided and Patient Education Provided     Education provided: Reviewed his home exercise and stretching program and instructed him to stretch diligently at home    Written Home Exercises Provided: yes.  Exercises were reviewed and Rayray was able to demonstrate them prior to the end of the session.  Rayray demonstrated good  understanding of the education provided.     See EMR under Patient Instructions for exercises provided prior visit.    Assessment   Left knee ROM measurements on 4/28/2023  80 degrees LLE active range of motion knee flexion on step   95 degrees after NK table     Patient is continuing to have stiffness and swelling in the Left Knee. Therapist working to improve strength and Range of Motion. He has still not heard an update on the JOSHUA splint. Feel he will definitely  need this to help maintain and possibly increase his Range of Motion in that knee.  We will continue to work with patient to progress him as able.       Range of Motion/Strength :   Left Extremity on 3/28/2023                                                           Left Extremity on 1/302023  [facility] PROM Strength  Location  [facility]    PROM   Strength     86   96  4/5   Knee    Flexion (140)  86  93  4+/5     -5  0  4/5                Extension (0)   0     4+/5         PMH from the Evaluation : Rayray is a 62 y.o. male referred to outpatient Physical Therapy with a medical diagnosis of Left Knee Pain. Rayray reports: He underwent Left TKA July 2019, Revision October 2020, and he has undergone 4 different Left Knee Manipulation and Scar tissue removal procedures. Last Manipulation was in 2021. He is here today due to continued Left Knee Pain and dysfunction. Left Knee presents with Flexion contracture and Left Knee significantly larger than the Right Knee. Patient presents with decreased Left Knee Flexion Range of Motion with hard end feel at end range. Left Knee is much larger than the Right most likely due to build up of scar tissue. Patient has difficulty with sit to stand especially from low chair. He shifts his weight to the Right in order to stand. Patient is noted to have decreased stance time and antalgic gait on the Left. Patient has decreased heel strike and hits foot flat with initial contact on the Left. He tends to keep the knee slightly flexed throughout gait cycle. Patient has decreased step/stride length resulting in slow michelle.  Patient will require Physical Therapy Intervention to address all deficits and work toward completion of all goals set. Therapist will refer patient back to MD upon completion of Therapy for further assessment if pain and dysfunction persist.          Rayray Is progressing well towards his goals.   Pt prognosis is Good.     Pt will continue to benefit from skilled outpatient  physical therapy to address the deficits listed in the problem list box on initial evaluation, provide pt/family education and to maximize pt's level of independence in the home and community environment.     Pt's spiritual, cultural and educational needs considered and pt agreeable to plan of care and goals.    Anticipated Barriers for therapy: Long Standing Left Knee Flexion Contracture     Goals:  Short Term Goals: 6 weeks   1. Independent with Home Exercise Program   2. Increase Left Knee Range of Motion to 0 Degrees to 90 Degrees  3. Increase Left Knee Strength to grossly 4+/5  4. Patient will ambulate 500 feet with stance time more equal from Left to Right with complaints of pain Less than or Equal to 4/10.       Long Term Goals: 12 weeks   1. Patient will increase Left Knee Strength to grossly 5/5  2. Increase Left Knee Range of Motion to 0 Degrees to 100 Degrees  3. Patient will ambulate 1000+ feet with Good Heel strike on the Left with complaints of pain Less than or Equal to 3/10.      Plan     Plan of care Certification: 3/21/2023 to 7/15/2023.     Outpatient Physical Therapy 2 times weekly for 12 weeks to include the following interventions: Electrical Stimulation to increase strength and decrease pain and inflammation as neeeded, Gait Training, Manual Therapy, Moist Heat/ Ice, Neuromuscular Re-ed, Patient Education, Therapeutic Activities, Therapeutic Exercise, and Ultrasound.        RADHA GARCIA, PT, DPT     4/28/2023

## 2023-05-05 ENCOUNTER — CLINICAL SUPPORT (OUTPATIENT)
Dept: REHABILITATION | Facility: HOSPITAL | Age: 63
End: 2023-05-05
Payer: OTHER GOVERNMENT

## 2023-05-05 DIAGNOSIS — M25.662 DECREASED RANGE OF MOTION OF LEFT KNEE: Primary | ICD-10-CM

## 2023-05-05 DIAGNOSIS — M25.562 ACUTE PAIN OF LEFT KNEE: ICD-10-CM

## 2023-05-05 DIAGNOSIS — R29.898 WEAKNESS OF LEFT LEG: ICD-10-CM

## 2023-05-05 PROCEDURE — 97110 THERAPEUTIC EXERCISES: CPT | Mod: CQ

## 2023-05-05 PROCEDURE — 97140 MANUAL THERAPY 1/> REGIONS: CPT | Mod: CQ

## 2023-05-05 PROCEDURE — 97530 THERAPEUTIC ACTIVITIES: CPT | Mod: CQ

## 2023-05-05 NOTE — PROGRESS NOTES
[facility]   Physical Therapy Treatment Note     Name: Rayray Hutchins  Clinic Number: [id number]  Therapy Diagnosis: Left Knee Pain   Physician: AMADOR Muse  Visit Date: 5/5/2023  Physician Orders: PT Eval and Treat   Medical Diagnosis from Referral: Left Knee Pain     Evaluation Date: 11/28/2022  Re-Evaluation Date : 3/21/2023  Authorization Period Expiration: 7/15/2023  Plan of Care Expiration: 7/15/2023  Visit # / Visits authorized: 25 / 30   PTA VISIT # 1/5    Treatment Time In: 09:56 am  Treatment Time Out: 10:53 am  Total Appointment Time (timed & untimed codes): 57 minutes    Precautions: Standard     Functional Level at time of Evaluation:  Since the first knee surgery in 2019 he has had continued pain and dysfunction in the Left Knee     Subjective     Pt reports: his knee is swollen and stiff today. [facility] word [facility] on [facility]UA splint approval.     He was compliant with home exercise program.    Pain: 5/10 upon arrival to Therapy; 7/10 with Stretches  Location: left knee      Objective     Rayray received therapeutic exercises to develop strength, endurance, ROM, and flexibility for 30 minutes including:    Knee Exercises  Bike  5 Min (seat #4 today)   Calf Stretch  5x20 seconds on slant board    Hamstring Stretch  5x20 seconds   Quad Stretch  8 x 15 sec at step   Cybex Leg Press - Bilateral  11 plates 2 x 10   Cybex Leg Press - Left Only   6 plates 3 x 10       Cybex Knee Extension  3 x 10 5 plates   Cybex Hamstring Curls  5 plates 3 x 10    Cybex Hip - Abduction bilateral   3 plates 20x   Cybex Hip - Flexion bilateral   3 plates 20x   Cybex Hip - Extension     Cable Knee TKE  5 plates 30x           Wall Squats with Ball  20x             Half kneeling knee flexion stretch on foam pad  10 x 10 second hold     NK Table   Manual Therapy                                    Rayray received the following THERAPEUTIC ACTIVITIES to improve functional performance for  10 Minutes  Fwd Steps x 30 at 6 inch step   Lateral Step downs x 30 at 6 inch step     Rayray received the following manual therapy techniques: Joint mobilizations, Myofacial release, and Soft tissue Mobilization were applied to the: Left Knee for 14 minutes, including:    Manual Prone Quad  NK chair for progressive creep stretch - open chain Knee Extension x 10 with 8.8 lbs followed by flexion stretch with over-pressure and (1) 8.8 lb, (1) 14.3lb plates x 10 Minutes for creep stretch (2 rounds)      Rayray participated in neuromuscular re-education activities to improve: Balance, Coordination, Kinesthetic, and Proprioception for 0 minutes. The following activities were included:  Single Leg Stance on Left     Patient received the following supervised modalities after being cleared for contradictions: Pre-Mod Electrical Stimulation:  Patient received Pre-Mod Electrical Stimulation for pain control applied to the Left Knee. Pt received stimulation at a frequency of  Hz for 0 minutes. Patient tolerated treatment well without any adverse effects.       Home Exercises Provided and Patient Education Provided     Education provided: Reviewed his home exercise and stretching program and instructed him to stretch diligently at home    Written Home Exercises Provided: yes.  Exercises were reviewed and Rayray was able to demonstrate them prior to the end of the session.  Rayray demonstrated good  understanding of the education provided.     See EMR under Patient Instructions for exercises provided prior visit.    Assessment   Left knee ROM measurements on 5/5/2023   85 degrees LLE active range of motion knee flexion on step   95 degrees after NK table utilizing creep stretch    Patient is continuing to have stiffness and swelling in the Left Knee. Therapist working to improve strength and Range of Motion. He tolerated progression of weight on cybex bilateral leg press today. He has still not heard an update on the JOSHUA  splint. Feel he will definitely need this to help maintain and possibly increase his Range of Motion in that knee.  We will continue to work with patient to progress him as able.       Range of Motion/Strength :   Left Extremity on 3/28/2023                                                           Left Extremity on 1/302023  [facility] PROM Strength  Location  [facility]    PROM   Strength     86   96  4/5   Knee    Flexion (140)  86  93  4+/5     -5  0  4/5                Extension (0)   0     4+/5         PMH from the Evaluation : Rayray is a 62 y.o. male referred to outpatient Physical Therapy with a medical diagnosis of Left Knee Pain. Rayray reports: He underwent Left TKA July 2019, Revision October 2020, and he has undergone 4 different Left Knee Manipulation and Scar tissue removal procedures. Last Manipulation was in 2021. He is here today due to continued Left Knee Pain and dysfunction. Left Knee presents with Flexion contracture and Left Knee significantly larger than the Right Knee. Patient presents with decreased Left Knee Flexion Range of Motion with hard end feel at end range. Left Knee is much larger than the Right most likely due to build up of scar tissue. Patient has difficulty with sit to stand especially from low chair. He shifts his weight to the Right in order to stand. Patient is noted to have decreased stance time and antalgic gait on the Left. Patient has decreased heel strike and hits foot flat with initial contact on the Left. He tends to keep the knee slightly flexed throughout gait cycle. Patient has decreased step/stride length resulting in slow michelle.  Patient will require Physical Therapy Intervention to address all deficits and work toward completion of all goals set. Therapist will refer patient back to MD upon completion of Therapy for further assessment if pain and dysfunction persist.          Rayray Is progressing well towards his goals.   Pt prognosis is Good.     Pt will continue to  benefit from skilled outpatient physical therapy to address the deficits listed in the problem list box on initial evaluation, provide pt/family education and to maximize pt's level of independence in the home and community environment.     Pt's spiritual, cultural and educational needs considered and pt agreeable to plan of care and goals.    Anticipated Barriers for therapy: Long Standing Left Knee Flexion Contracture     Goals:  Short Term Goals: 6 weeks   1. Independent with Home Exercise Program   2. Increase Left Knee Range of Motion to 0 Degrees to 90 Degrees  3. Increase Left Knee Strength to grossly 4+/5  4. Patient will ambulate 500 feet with stance time more equal from Left to Right with complaints of pain Less than or Equal to 4/10.       Long Term Goals: 12 weeks   1. Patient will increase Left Knee Strength to grossly 5/5  2. Increase Left Knee Range of Motion to 0 Degrees to 100 Degrees  3. Patient will ambulate 1000+ feet with Good Heel strike on the Left with complaints of pain Less than or Equal to 3/10.      Plan     Plan of care Certification: 3/21/2023 to 7/15/2023.     Outpatient Physical Therapy 2 times weekly for 12 weeks to include the following interventions: Electrical Stimulation to increase strength and decrease pain and inflammation as neeeded, Gait Training, Manual Therapy, Moist Heat/ Ice, Neuromuscular Re-ed, Patient Education, Therapeutic Activities, Therapeutic Exercise, and Ultrasound.        Lencho Forrester, PTA    5/5/2023

## 2023-05-26 ENCOUNTER — CLINICAL SUPPORT (OUTPATIENT)
Dept: REHABILITATION | Facility: HOSPITAL | Age: 63
End: 2023-05-26
Payer: OTHER GOVERNMENT

## 2023-05-26 DIAGNOSIS — M25.662 DECREASED RANGE OF MOTION OF LEFT KNEE: Primary | ICD-10-CM

## 2023-05-26 DIAGNOSIS — M25.562 ACUTE PAIN OF LEFT KNEE: ICD-10-CM

## 2023-05-26 DIAGNOSIS — R29.898 WEAKNESS OF LEFT LEG: ICD-10-CM

## 2023-05-26 PROCEDURE — 97530 THERAPEUTIC ACTIVITIES: CPT

## 2023-05-26 PROCEDURE — 97140 MANUAL THERAPY 1/> REGIONS: CPT

## 2023-05-26 PROCEDURE — 97110 THERAPEUTIC EXERCISES: CPT

## 2023-05-26 NOTE — PROGRESS NOTES
[facility]   Physical Therapy Treatment Note     Name: Rayray Hutchins  Clinic Number: [id number]  Therapy Diagnosis: Left Knee Pain   Physician: AMADOR Muse  Visit Date: 5/26/2023  Physician Orders: PT Eval and Treat   Medical Diagnosis from Referral: Left Knee Pain     Evaluation Date: 11/28/2022  Re-Evaluation Date : 3/21/2023  Authorization Period Expiration: 7/15/2023  Plan of Care Expiration: 7/15/2023  Visit # / Visits authorized: 26 / 30   PTA VISIT # 1/5    Treatment Time In: 09:56 am  Treatment Time Out: 10:52 am  Total Appointment Time (timed & untimed codes): 55 minutes    Precautions: Standard     Functional Level at time of Evaluation:  Since the first knee surgery in 2019 he has had continued pain and dysfunction in the Left Knee     Subjective     Pt reports: he has had a sinus infection and overall has not been feeling good. He reports increased pain in Left Knee over the past few days.  He was compliant with home exercise program.    Pain: 8/10 upon arrival to Therapy; 9/10 with Stretches  Location: left knee      Objective     Rayray received therapeutic exercises to develop strength, endurance, ROM, and flexibility for 30 minutes including:    Knee Exercises  Bike  5 Min (seat #4 today)   Calf Stretch  5x20 seconds on slant board    Hamstring Stretch  5x20 seconds   Quad Stretch  8 x 15 sec at step   Cybex Leg Press - Bilateral  9 plates 2 x 10   Cybex Leg Press - Left Only   5 plates 3 x 10       Cybex Knee Extension  3 x 10 5 plates   Cybex Hamstring Curls  5 plates 3 x 10    Cybex Hip - Abduction bilateral   3 plates 20x   Cybex Hip - Flexion bilateral   3 plates 20x   Cybex Hip - Extension     Cable Knee TKE  5 plates 30x           Wall Squats with Ball  20x             Half kneeling knee flexion stretch on foam pad  10 x 10 second hold     NK Table   Manual Therapy                                    Rayray received the following THERAPEUTIC  ACTIVITIES to improve functional performance for 10 Minutes  Fwd Steps x 30 at 6 inch step   Lateral Step downs x 30 at 6 inch step     Rayray received the following manual therapy techniques: Joint mobilizations, Myofacial release, and Soft tissue Mobilization were applied to the: Left Knee for 15 minutes, including:    Manual Prone Quad  NK chair for progressive creep stretch - open chain Knee Extension x 10 with 8.8 lbs followed by flexion stretch with over-pressure and (1) 8.8 lb, (1) 14.3lb plates x 10 Minutes for creep stretch (2 rounds)      Rayray participated in neuromuscular re-education activities to improve: Balance, Coordination, Kinesthetic, and Proprioception for 0 minutes. The following activities were included:  Single Leg Stance on Left     Patient received the following supervised modalities after being cleared for contradictions: Pre-Mod Electrical Stimulation:  Patient received Pre-Mod Electrical Stimulation for pain control applied to the Left Knee. Pt received stimulation at a frequency of  Hz for 0 minutes. Patient tolerated treatment well without any adverse effects.       Home Exercises Provided and Patient Education Provided     Education provided: Reviewed his home exercise and stretching program and instructed him to stretch diligently at home    Written Home Exercises Provided: yes.  Exercises were reviewed and Rayray was able to demonstrate them prior to the end of the session.  Rayray demonstrated good  understanding of the education provided.     See EMR under Patient Instructions for exercises provided prior visit.    Assessment   Left knee ROM measurements on 5/26/2023   85 degrees LLE active range of motion knee flexion on step   95 degrees after NK table utilizing creep stretch    Patient has had increased pain in the Left Knee over the past few days. He reports his ankles and knees are both hurting more today. Left Knee remains very stiff with max flexion Range of Motion  reached of 95 degrees after a 10 minute prolonged stretch on the NK chair. We are continuing to work on strengthening and aggressive Range of Motion. Still have not heard back about approval for the JOSHUA Splint.. We will continue with the current Plan of Care and progress patient as able.         Range of Motion/Strength :   Left Extremity on 3/28/2023                                                           Left Extremity on 1/302023  [facility] PROM Strength  Location  [facility]    PROM   Strength     86   96  4/5   Knee    Flexion (140)  86  93  4+/5     -5  0  4/5                Extension (0)   0     4+/5         PMH from the Evaluation : Rayray is a 62 y.o. male referred to outpatient Physical Therapy with a medical diagnosis of Left Knee Pain. Rayray reports: He underwent Left TKA July 2019, Revision October 2020, and he has undergone 4 different Left Knee Manipulation and Scar tissue removal procedures. Last Manipulation was in 2021. He is here today due to continued Left Knee Pain and dysfunction. Left Knee presents with Flexion contracture and Left Knee significantly larger than the Right Knee. Patient presents with decreased Left Knee Flexion Range of Motion with hard end feel at end range. Left Knee is much larger than the Right most likely due to build up of scar tissue. Patient has difficulty with sit to stand especially from low chair. He shifts his weight to the Right in order to stand. Patient is noted to have decreased stance time and antalgic gait on the Left. Patient has decreased heel strike and hits foot flat with initial contact on the Left. He tends to keep the knee slightly flexed throughout gait cycle. Patient has decreased step/stride length resulting in slow michelle.  Patient will require Physical Therapy Intervention to address all deficits and work toward completion of all goals set. Therapist will refer patient back to MD upon completion of Therapy for further assessment if pain and dysfunction  persist.          Rayray Is progressing well towards his goals.   Pt prognosis is Good.     Pt will continue to benefit from skilled outpatient physical therapy to address the deficits listed in the problem list box on initial evaluation, provide pt/family education and to maximize pt's level of independence in the home and community environment.     Pt's spiritual, cultural and educational needs considered and pt agreeable to plan of care and goals.    Anticipated Barriers for therapy: Long Standing Left Knee Flexion Contracture     Goals:  Short Term Goals: 6 weeks   1. Independent with Home Exercise Program   2. Increase Left Knee Range of Motion to 0 Degrees to 90 Degrees  3. Increase Left Knee Strength to grossly 4+/5  4. Patient will ambulate 500 feet with stance time more equal from Left to Right with complaints of pain Less than or Equal to 4/10.       Long Term Goals: 12 weeks   1. Patient will increase Left Knee Strength to grossly 5/5  2. Increase Left Knee Range of Motion to 0 Degrees to 100 Degrees  3. Patient will ambulate 1000+ feet with Good Heel strike on the Left with complaints of pain Less than or Equal to 3/10.      Plan     Plan of care Certification: 3/21/2023 to 7/15/2023.     Outpatient Physical Therapy 2 times weekly for 12 weeks to include the following interventions: Electrical Stimulation to increase strength and decrease pain and inflammation as neeeded, Gait Training, Manual Therapy, Moist Heat/ Ice, Neuromuscular Re-ed, Patient Education, Therapeutic Activities, Therapeutic Exercise, and Ultrasound.        RADHA GARCIA, PT, DPT     5/26/2023

## 2023-06-09 ENCOUNTER — CLINICAL SUPPORT (OUTPATIENT)
Dept: REHABILITATION | Facility: HOSPITAL | Age: 63
End: 2023-06-09
Payer: OTHER GOVERNMENT

## 2023-06-09 DIAGNOSIS — M25.662 DECREASED RANGE OF MOTION OF LEFT KNEE: Primary | ICD-10-CM

## 2023-06-09 DIAGNOSIS — R29.898 WEAKNESS OF LEFT LEG: ICD-10-CM

## 2023-06-09 DIAGNOSIS — M25.562 ACUTE PAIN OF LEFT KNEE: ICD-10-CM

## 2023-06-09 PROCEDURE — 97110 THERAPEUTIC EXERCISES: CPT

## 2023-06-09 PROCEDURE — 97140 MANUAL THERAPY 1/> REGIONS: CPT

## 2023-06-09 PROCEDURE — 97530 THERAPEUTIC ACTIVITIES: CPT

## 2023-06-09 NOTE — PROGRESS NOTES
[facility]   Physical Therapy Treatment Note     Name: Rayray Hutchins  Clinic Number: [id number]  Therapy Diagnosis: Left Knee Pain   Physician: AMADOR Muse  Visit Date: 6/9/2023  Physician Orders: PT Eval and Treat   Medical Diagnosis from Referral: Left Knee Pain     Evaluation Date: 11/28/2022  Re-Evaluation Date : 3/21/2023  Authorization Period Expiration: 7/15/2023  Plan of Care Expiration: 7/15/2023  Visit # / Visits authorized: 27 / 30   PTA VISIT # 1/5    Treatment Time In: 09:55 am  Treatment Time Out: 10:48 am  Total Appointment Time (timed & untimed codes): 53 minutes    Precautions: Standard     Functional Level at time of Evaluation:  Since the first knee surgery in 2019 he has had continued pain and dysfunction in the Left Knee     Subjective     Pt reports: his knee is not hurting too bad today, but the Left ankle is killing him.   He was compliant with home exercise program.    Pain: 4/10 upon arrival to Therapy; 9/10 with Stretches  Location: left knee      Objective     Rayray received therapeutic exercises to develop strength, endurance, ROM, and flexibility for 30 minutes including:    Knee Exercises  Bike  5 Min (seat #4 today)   Calf Stretch  5x20 seconds on slant board    Hamstring Stretch  5x20 seconds   Quad Stretch  8 x 15 sec at step   Cybex Leg Press - Bilateral  9 plates 2 x 10   Cybex Leg Press - Left Only   5 plates 3 x 10       Cybex Knee Extension  3 x 10 4 plates   Cybex Hamstring Curls  5 plates 3 x 10    Cybex Hip - Abduction bilateral   3 plates 20x   Cybex Hip - Flexion bilateral   3 plates 20x   Cybex Hip - Extension     Cable Knee TKE  5 plates 30x           Wall Squats with Ball  20x             Half kneeling knee flexion stretch on foam pad  10 x 10 second hold     NK Table   Manual Therapy                                    Rayray received the following THERAPEUTIC ACTIVITIES to improve functional performance for 10  "Minutes  Fwd Steps x 30 at 6 inch step   Lateral Step downs x 30 at 6 inch step     Rayray received the following manual therapy techniques: Joint mobilizations, Myofacial release, and Soft tissue Mobilization were applied to the: Left Knee for 13 minutes, including:    Manual Prone Quad  NK chair for progressive creep stretch - open chain Knee Extension x 10 with 8.8 lbs followed by flexion stretch with over-pressure and (1) 8.8 lb, (1) 14.3lb plates x 10 Minutes for creep stretch (2 rounds)      Rayray participated in neuromuscular re-education activities to improve: Balance, Coordination, Kinesthetic, and Proprioception for 0 minutes. The following activities were included:  Single Leg Stance on Left     Patient received the following supervised modalities after being cleared for contradictions: Pre-Mod Electrical Stimulation:  Patient received Pre-Mod Electrical Stimulation for pain control applied to the Left Knee. Pt received stimulation at a frequency of  Hz for 0 minutes. Patient tolerated treatment well without any adverse effects.       Home Exercises Provided and Patient Education Provided     Education provided: Reviewed his home exercise and stretching program and instructed him to stretch diligently at home    Written Home Exercises Provided: yes.  Exercises were reviewed and Rayray was able to demonstrate them prior to the end of the session.  Rayray demonstrated good  understanding of the education provided.     See EMR under Patient Instructions for exercises provided prior visit.    Assessment   Left knee ROM measurements on 6/9/2023   85 degrees LLE active range of motion knee flexion on step   95 degrees after NK table utilizing creep stretch    Patient's knee pain is better today, but his Left ankle is "killing" him. This is an old injury that flares up from time to time. He was able to perform most of the exercises today, but we did hold the Cybex Leg Press due to the ankle pain. He " tolerated the NK chair well and was able to perform the entire 10 minute prolonged stretch with no breaks at all. We will continue to work with patient to increase Left Knee Strength and Range of Motion.             Range of Motion/Strength :   Left Extremity on 3/28/2023                                                           Left Extremity on 1/302023  [facility] PROM Strength  Location  [facility]    PROM   Strength     86   96  4/5   Knee    Flexion (140)  86  93  4+/5     -5  0  4/5                Extension (0)   0     4+/5         PMH from the Evaluation : Rayray is a 62 y.o. male referred to outpatient Physical Therapy with a medical diagnosis of Left Knee Pain. Rayray reports: He underwent Left TKA July 2019, Revision October 2020, and he has undergone 4 different Left Knee Manipulation and Scar tissue removal procedures. Last Manipulation was in 2021. He is here today due to continued Left Knee Pain and dysfunction. Left Knee presents with Flexion contracture and Left Knee significantly larger than the Right Knee. Patient presents with decreased Left Knee Flexion Range of Motion with hard end feel at end range. Left Knee is much larger than the Right most likely due to build up of scar tissue. Patient has difficulty with sit to stand especially from low chair. He shifts his weight to the Right in order to stand. Patient is noted to have decreased stance time and antalgic gait on the Left. Patient has decreased heel strike and hits foot flat with initial contact on the Left. He tends to keep the knee slightly flexed throughout gait cycle. Patient has decreased step/stride length resulting in slow michelle.  Patient will require Physical Therapy Intervention to address all deficits and work toward completion of all goals set. Therapist will refer patient back to MD upon completion of Therapy for further assessment if pain and dysfunction persist.          Rayray Is progressing well towards his goals.   Pt prognosis is  Good.     Pt will continue to benefit from skilled outpatient physical therapy to address the deficits listed in the problem list box on initial evaluation, provide pt/family education and to maximize pt's level of independence in the home and community environment.     Pt's spiritual, cultural and educational needs considered and pt agreeable to plan of care and goals.    Anticipated Barriers for therapy: Long Standing Left Knee Flexion Contracture     Goals:  Short Term Goals: 6 weeks   1. Independent with Home Exercise Program   2. Increase Left Knee Range of Motion to 0 Degrees to 90 Degrees  3. Increase Left Knee Strength to grossly 4+/5  4. Patient will ambulate 500 feet with stance time more equal from Left to Right with complaints of pain Less than or Equal to 4/10.       Long Term Goals: 12 weeks   1. Patient will increase Left Knee Strength to grossly 5/5  2. Increase Left Knee Range of Motion to 0 Degrees to 100 Degrees  3. Patient will ambulate 1000+ feet with Good Heel strike on the Left with complaints of pain Less than or Equal to 3/10.      Plan     Plan of care Certification: 3/21/2023 to 7/15/2023.     Outpatient Physical Therapy 2 times weekly for 12 weeks to include the following interventions: Electrical Stimulation to increase strength and decrease pain and inflammation as neeeded, Gait Training, Manual Therapy, Moist Heat/ Ice, Neuromuscular Re-ed, Patient Education, Therapeutic Activities, Therapeutic Exercise, and Ultrasound.        RADHA GARCIA, PT, DPT     6/9/2023

## 2023-06-16 ENCOUNTER — CLINICAL SUPPORT (OUTPATIENT)
Dept: REHABILITATION | Facility: HOSPITAL | Age: 63
End: 2023-06-16
Payer: OTHER GOVERNMENT

## 2023-06-16 DIAGNOSIS — R29.898 WEAKNESS OF LEFT LEG: ICD-10-CM

## 2023-06-16 DIAGNOSIS — M25.562 ACUTE PAIN OF LEFT KNEE: ICD-10-CM

## 2023-06-16 DIAGNOSIS — M25.662 DECREASED RANGE OF MOTION OF LEFT KNEE: Primary | ICD-10-CM

## 2023-06-16 PROCEDURE — 97530 THERAPEUTIC ACTIVITIES: CPT

## 2023-06-16 PROCEDURE — 97110 THERAPEUTIC EXERCISES: CPT

## 2023-06-16 PROCEDURE — 97140 MANUAL THERAPY 1/> REGIONS: CPT

## 2023-06-16 NOTE — PROGRESS NOTES
[facility]   Physical Therapy Treatment Note     Name: Rayray Hutchins  Clinic Number: [id number]  Therapy Diagnosis: Left Knee Pain   Physician: AMADOR Muse  Visit Date: 6/16/2023  Physician Orders: PT Eval and Treat   Medical Diagnosis from Referral: Left Knee Pain     Evaluation Date: 11/28/2022  Re-Evaluation Date : 3/21/2023  Authorization Period Expiration: 7/15/2023  Plan of Care Expiration: 7/15/2023  Visit # / Visits authorized: 28 / 30   PTA VISIT # 1/5    Treatment Time In: 10:55  am  Treatment Time Out: 11:52 am  Total Appointment Time (timed & untimed codes): 53 minutes    Precautions: Standard     Functional Level at time of Evaluation:  Since the first knee surgery in 2019 he has had continued pain and dysfunction in the Left Knee     Subjective     Pt reports: his knee is stiff as usual but the pain is not as bad today.  He was compliant with home exercise program.    Pain: 4/10 upon arrival to Therapy; 7/10 with Stretches  Location: left knee      Objective     Rayray received therapeutic exercises to develop strength, endurance, ROM, and flexibility for 30 minutes including:    Knee Exercises  Bike  5 Min (seat #4 today)   Calf Stretch  5x20 seconds on slant board    Hamstring Stretch  5x20 seconds   Quad Stretch  8 x 15 sec at step   Cybex Leg Press - Bilateral  9 plates 2 x 10   Cybex Leg Press - Left Only   5 plates 3 x 10       Cybex Knee Extension  3 x 10 5 plates   Cybex Hamstring Curls  5 plates 3 x 10    Cybex Hip - Abduction bilateral   3 plates 3 x 10   Cybex Hip - Flexion bilateral   3 plates 3 x 10   Cybex Hip - Extension  3 plates 3 x 10   Cable Knee TKE  5 plates 30x           Wall Squats with Ball  20x             Half kneeling knee flexion stretch on foam pad  10 x 10 second hold     NK Table   Manual Therapy                                    Rayray received the following THERAPEUTIC ACTIVITIES to improve functional performance for 10  Minutes  Fwd Steps x 30 at 6 inch step   Lateral Step downs x 30 at 6 inch step     Rayray received the following manual therapy techniques: Joint mobilizations, Myofacial release, and Soft tissue Mobilization were applied to the: Left Knee for 13 minutes, including:    Manual Prone Quad  NK chair for progressive creep stretch - open chain Knee Extension x 10 with 8.8 lbs followed by flexion stretch with over-pressure and (1) 8.8 lb, (1) 14.3lb plates x 10 Minutes for creep stretch (2 rounds)      Rayray participated in neuromuscular re-education activities to improve: Balance, Coordination, Kinesthetic, and Proprioception for 0 minutes. The following activities were included:  Single Leg Stance on Left     Patient received the following supervised modalities after being cleared for contradictions: Pre-Mod Electrical Stimulation:  Patient received Pre-Mod Electrical Stimulation for pain control applied to the Left Knee. Pt received stimulation at a frequency of  Hz for 0 minutes. Patient tolerated treatment well without any adverse effects.       Home Exercises Provided and Patient Education Provided     Education provided: Reviewed his home exercise and stretching program and instructed him to stretch diligently at home    Written Home Exercises Provided: yes.  Exercises were reviewed and Rayray was able to demonstrate them prior to the end of the session.  Rayray demonstrated good  understanding of the education provided.     See EMR under Patient Instructions for exercises provided prior visit.    Assessment   Left knee ROM measurements on 6/16/2023   85 degrees LLE active range of motion knee flexion on step   95 degrees after NK table utilizing creep stretch    Patient's pain was a little better today. He continues to have stiffness and decreased Range of Motion in the knee. Overall his max Knee Flexion Range of Motion reached has been 95 degrees over the past few weeks. Therapist performed strengthening  as well as stretching exercises to promote more normal motion in that Left Knee. Placed patient on the NK chair for a low load prolonged knee flexion stretch of 10 minutes with no rest break. He tolerated the treatment well, but knee flexion remained at 95 degrees at end of session. We will continue to progress patient as able. Sup Visit performed today with IZABEL Perez and IZABEL Jackson.  All goals and treatment plan reviewed. Will work toward completion of all goals set.              Range of Motion/Strength :   Left Extremity on 3/28/2023                                                           Left Extremity on 1/302023  [facility] PROM Strength  Location  [facility]    PROM   Strength     86   96  4/5   Knee    Flexion (140)  86  93  4+/5     -5  0  4/5                Extension (0)   0     4+/5         PMH from the Evaluation : Rayray is a 62 y.o. male referred to outpatient Physical Therapy with a medical diagnosis of Left Knee Pain. Rayray reports: He underwent Left TKA July 2019, Revision October 2020, and he has undergone 4 different Left Knee Manipulation and Scar tissue removal procedures. Last Manipulation was in 2021. He is here today due to continued Left Knee Pain and dysfunction. Left Knee presents with Flexion contracture and Left Knee significantly larger than the Right Knee. Patient presents with decreased Left Knee Flexion Range of Motion with hard end feel at end range. Left Knee is much larger than the Right most likely due to build up of scar tissue. Patient has difficulty with sit to stand especially from low chair. He shifts his weight to the Right in order to stand. Patient is noted to have decreased stance time and antalgic gait on the Left. Patient has decreased heel strike and hits foot flat with initial contact on the Left. He tends to keep the knee slightly flexed throughout gait cycle. Patient has decreased step/stride length resulting in slow michelle.  Patient will require  Physical Therapy Intervention to address all deficits and work toward completion of all goals set. Therapist will refer patient back to MD upon completion of Therapy for further assessment if pain and dysfunction persist.          Rayray Is progressing well towards his goals.   Pt prognosis is Good.     Pt will continue to benefit from skilled outpatient physical therapy to address the deficits listed in the problem list box on initial evaluation, provide pt/family education and to maximize pt's level of independence in the home and community environment.     Pt's spiritual, cultural and educational needs considered and pt agreeable to plan of care and goals.    Anticipated Barriers for therapy: Long Standing Left Knee Flexion Contracture     Goals:  Short Term Goals: 6 weeks   1. Independent with Home Exercise Program   2. Increase Left Knee Range of Motion to 0 Degrees to 90 Degrees  3. Increase Left Knee Strength to grossly 4+/5  4. Patient will ambulate 500 feet with stance time more equal from Left to Right with complaints of pain Less than or Equal to 4/10.       Long Term Goals: 12 weeks   1. Patient will increase Left Knee Strength to grossly 5/5  2. Increase Left Knee Range of Motion to 0 Degrees to 100 Degrees  3. Patient will ambulate 1000+ feet with Good Heel strike on the Left with complaints of pain Less than or Equal to 3/10.      Plan     Plan of care Certification: 3/21/2023 to 7/15/2023.     Outpatient Physical Therapy 2 times weekly for 12 weeks to include the following interventions: Electrical Stimulation to increase strength and decrease pain and inflammation as neeeded, Gait Training, Manual Therapy, Moist Heat/ Ice, Neuromuscular Re-ed, Patient Education, Therapeutic Activities, Therapeutic Exercise, and Ultrasound.        RADHA GARCIA, PT, DPT     6/16/2023

## 2023-06-21 ENCOUNTER — CLINICAL SUPPORT (OUTPATIENT)
Dept: REHABILITATION | Facility: HOSPITAL | Age: 63
End: 2023-06-21
Payer: OTHER GOVERNMENT

## 2023-06-21 DIAGNOSIS — M25.562 ACUTE PAIN OF LEFT KNEE: ICD-10-CM

## 2023-06-21 DIAGNOSIS — M25.662 DECREASED RANGE OF MOTION OF LEFT KNEE: Primary | ICD-10-CM

## 2023-06-21 DIAGNOSIS — R29.898 WEAKNESS OF LEFT LEG: ICD-10-CM

## 2023-06-21 PROCEDURE — 97110 THERAPEUTIC EXERCISES: CPT | Mod: CQ

## 2023-06-21 PROCEDURE — 97530 THERAPEUTIC ACTIVITIES: CPT | Mod: CQ

## 2023-06-21 PROCEDURE — 97140 MANUAL THERAPY 1/> REGIONS: CPT | Mod: CQ

## 2023-06-21 NOTE — PROGRESS NOTES
[facility]   Physical Therapy Treatment Note/Discharge Summary      Name: Rayray Hutchins  Clinic Number: [id number]  Therapy Diagnosis: Left Knee Pain   Physician: AMADOR Muse  Visit Date: 6/21/2023  Physician Orders: PT Eval and Treat   Medical Diagnosis from Referral: Left Knee Pain     Evaluation Date: 11/28/2022  Re-Evaluation Date : 3/21/2023  Authorization Period Expiration: 7/15/2023  Plan of Care Expiration: 7/15/2023  Visit # / Visits authorized: 29 / 30   PTA VISIT # 1/5    Treatment Time In: 1:43 pm  Treatment Time Out: 2:37 pm  Total Appointment Time (timed & untimed codes): 54 minutes    Precautions: Standard     Functional Level at time of Evaluation:  Since the first knee surgery in 2019 he has had continued pain and dysfunction in the Left Knee     Subjective     Pt reports: his knee is stiff as usual but he is doing ok today.   He was compliant with home exercise program.    Pain: 5/10 upon arrival to Therapy; 7/10 with Stretches  Location: left knee      Objective     Rayray received therapeutic exercises to develop strength, endurance, ROM, and flexibility for 15 minutes including:    Knee Exercises  Bike  5 Min (seat #4 today)   Calf Stretch  5x20 seconds on slant board    Hamstring Stretch  5x20 seconds   Quad Stretch  8 x 15 sec at step   Cybex Leg Press - Bilateral  9 plates 3 x 10   Cybex Leg Press - Left Only   5 plates 3 x 10       Cybex Knee Extension  3 x 10 5 plates   Cybex Hamstring Curls  5 plates 3 x 10    Cybex Hip - Abduction bilateral   3 plates 3 x 10   Cybex Hip - Flexion bilateral   3 plates 3 x 10   Cybex Hip - Extension  3 plates 3 x 10   Cable Knee TKE  5 plates 30x (not performed today)            Wall Squats with Ball  20x (not performed today)              Half kneeling knee flexion stretch on foam pad  10 x 10 second hold  (not performed today)    NK Table   Manual Therapy                                    Rayray received  the following THERAPEUTIC ACTIVITIES to improve functional performance for 10 Minutes  Fwd Steps x 30 at 6 inch step   Lateral Step downs x 30 at 6 inch step     Rayray received the following manual therapy techniques: Joint mobilizations, Myofacial release, and Soft tissue Mobilization were applied to the: Left Knee for 23 minutes, including:    Manual Prone Quad with OP  NK chair for progressive creep stretch - open chain Knee Extension x 10 with 8.8 lbs followed by flexion stretch with over-pressure and (1) 8.8 lb, (1) 14.3lb plates x 10 Minutes for creep stretch (2 rounds) (1st round 10 minutes 2nd round 5 minutes)    Rayray participated in neuromuscular re-education activities to improve: Balance, Coordination, Kinesthetic, and Proprioception for 0 minutes. The following activities were included:  Single Leg Stance on Left     Patient received the following supervised modalities after being cleared for contradictions: Pre-Mod Electrical Stimulation:  Patient received Pre-Mod Electrical Stimulation for pain control applied to the Left Knee. Pt received stimulation at a frequency of  Hz for 0 minutes. Patient tolerated treatment well without any adverse effects.       Home Exercises Provided and Patient Education Provided     Education provided: Reviewed his home exercise and stretching program and instructed him to stretch diligently at home    Written Home Exercises Provided: yes.  Exercises were reviewed and Rayray was able to demonstrate them prior to the end of the session.  Rayray demonstrated good  understanding of the education provided.     See EMR under Patient Instructions for exercises provided prior visit.    Assessment   Left knee ROM measurements on 6/21/2023   88 degrees LLE active range of motion knee flexion seated  95 degrees after NK table utilizing creep stretch    Patient's pain was a little better today.  He continues to have stiffness and decreased Range of Motion in the knee.  Overall his max Knee Flexion Range of Motion reached has been 95 degrees over the past few weeks. Therapist performed strengthening as well as stretching exercises to promote more normal motion in that Left Knee. Placed patient on the NK chair for a low load prolonged knee flexion stretch of 10 minutes with no rest break on this followed by open chain knee extension x 10 repetitions. He tolerated the treatment well, but knee flexion remained at 95 degrees at end of session. He reports he was pretty fatigued at conclusion of today's session. Will work toward completion of all goals set when Patient returns.     Range of Motion/Strength :   Left Extremity on 3/28/2023                                                           Left Extremity on 1/302023  [facility] PROM Strength  Location  [facility]    PROM   Strength     86   96  4/5   Knee    Flexion (140)  86  93  4+/5     -5  0  4/5                Extension (0)   0     4+/5         PMH from the Evaluation : Rayray is a 62 y.o. male referred to outpatient Physical Therapy with a medical diagnosis of Left Knee Pain. Rayray reports: He underwent Left TKA July 2019, Revision October 2020, and he has undergone 4 different Left Knee Manipulation and Scar tissue removal procedures. Last Manipulation was in 2021. He is here today due to continued Left Knee Pain and dysfunction. Left Knee presents with Flexion contracture and Left Knee significantly larger than the Right Knee. Patient presents with decreased Left Knee Flexion Range of Motion with hard end feel at end range. Left Knee is much larger than the Right most likely due to build up of scar tissue. Patient has difficulty with sit to stand especially from low chair. He shifts his weight to the Right in order to stand. Patient is noted to have decreased stance time and antalgic gait on the Left. Patient has decreased heel strike and hits foot flat with initial contact on the Left. He tends to keep the knee slightly flexed  throughout gait cycle. Patient has decreased step/stride length resulting in slow michelle.  Patient will require Physical Therapy Intervention to address all deficits and work toward completion of all goals set. Therapist will refer patient back to MD upon completion of Therapy for further assessment if pain and dysfunction persist.          Rayray Is progressing well towards his goals.   Pt prognosis is Good.     Pt will continue to benefit from skilled outpatient physical therapy to address the deficits listed in the problem list box on initial evaluation, provide pt/family education and to maximize pt's level of independence in the home and community environment.     Pt's spiritual, cultural and educational needs considered and pt agreeable to plan of care and goals.    Anticipated Barriers for therapy: Long Standing Left Knee Flexion Contracture     Goals:  Short Term Goals: 6 weeks   1. Independent with Home Exercise Program   2. Increase Left Knee Range of Motion to 0 Degrees to 90 Degrees  3. Increase Left Knee Strength to grossly 4+/5  4. Patient will ambulate 500 feet with stance time more equal from Left to Right with complaints of pain Less than or Equal to 4/10.       Long Term Goals: 12 weeks   1. Patient will increase Left Knee Strength to grossly 5/5  2. Increase Left Knee Range of Motion to 0 Degrees to 100 Degrees  3. Patient will ambulate 1000+ feet with Good Heel strike on the Left with complaints of pain Less than or Equal to 3/10.      Plan     Plan of care Certification: 3/21/2023 to 7/15/2023.     Outpatient Physical Therapy 2 times weekly for 12 weeks to include the following interventions: Electrical Stimulation to increase strength and decrease pain and inflammation as neeeded, Gait Training, Manual Therapy, Moist Heat/ Ice, Neuromuscular Re-ed, Patient Education, Therapeutic Activities, Therapeutic Exercise, and Ultrasound.        Lencho Forrester, PTA    6/21/2023          Due to  scheduling conflict, Patient did not return to Therapy following this treatment on 6/21/2023. Therefore patient is discharged from Physical Therapy services at current level of function as listed above.     RADHA GARCIA, PT, DPT

## 2023-08-25 PROBLEM — M25.562 ACUTE PAIN OF LEFT KNEE: Status: RESOLVED | Noted: 2022-11-28 | Resolved: 2023-08-25

## 2023-08-25 PROBLEM — M25.662 DECREASED RANGE OF MOTION OF LEFT KNEE: Status: RESOLVED | Noted: 2022-11-28 | Resolved: 2023-08-25

## 2023-08-25 PROBLEM — R29.898 WEAKNESS OF LEFT LEG: Status: RESOLVED | Noted: 2022-11-28 | Resolved: 2023-08-25

## 2023-08-25 NOTE — PLAN OF CARE
[facility]   Physical Therapy Treatment Note/Discharge Summary      Name: Rayray Hutchins  Clinic Number: [id number]  Therapy Diagnosis: Left Knee Pain   Physician: AMADOR Muse  Visit Date: 6/21/2023  Physician Orders: PT Eval and Treat   Medical Diagnosis from Referral: Left Knee Pain     Evaluation Date: 11/28/2022  Re-Evaluation Date : 3/21/2023  Authorization Period Expiration: 7/15/2023  Plan of Care Expiration: 7/15/2023  Visit # / Visits authorized: 29 / 30   PTA VISIT # 1/5    Treatment Time In: 1:43 pm  Treatment Time Out: 2:37 pm  Total Appointment Time (timed & untimed codes): 54 minutes    Precautions: Standard     Functional Level at time of Evaluation:  Since the first knee surgery in 2019 he has had continued pain and dysfunction in the Left Knee     Subjective     Pt reports: his knee is stiff as usual but he is doing ok today.   He was compliant with home exercise program.    Pain: 5/10 upon arrival to Therapy; 7/10 with Stretches  Location: left knee      Objective     Rayray received therapeutic exercises to develop strength, endurance, ROM, and flexibility for 15 minutes including:    Knee Exercises  Bike  5 Min (seat #4 today)   Calf Stretch  5x20 seconds on slant board    Hamstring Stretch  5x20 seconds   Quad Stretch  8 x 15 sec at step   Cybex Leg Press - Bilateral  9 plates 3 x 10   Cybex Leg Press - Left Only   5 plates 3 x 10       Cybex Knee Extension  3 x 10 5 plates   Cybex Hamstring Curls  5 plates 3 x 10    Cybex Hip - Abduction bilateral   3 plates 3 x 10   Cybex Hip - Flexion bilateral   3 plates 3 x 10   Cybex Hip - Extension  3 plates 3 x 10   Cable Knee TKE  5 plates 30x (not performed today)            Wall Squats with Ball  20x (not performed today)              Half kneeling knee flexion stretch on foam pad  10 x 10 second hold  (not performed today)    NK Table   Manual Therapy                                    Rayray received the  following THERAPEUTIC ACTIVITIES to improve functional performance for 10 Minutes  Fwd Steps x 30 at 6 inch step   Lateral Step downs x 30 at 6 inch step     Rayray received the following manual therapy techniques: Joint mobilizations, Myofacial release, and Soft tissue Mobilization were applied to the: Left Knee for 23 minutes, including:    Manual Prone Quad with OP  NK chair for progressive creep stretch - open chain Knee Extension x 10 with 8.8 lbs followed by flexion stretch with over-pressure and (1) 8.8 lb, (1) 14.3lb plates x 10 Minutes for creep stretch (2 rounds) (1st round 10 minutes 2nd round 5 minutes)    Rayray participated in neuromuscular re-education activities to improve: Balance, Coordination, Kinesthetic, and Proprioception for 0 minutes. The following activities were included:  Single Leg Stance on Left     Patient received the following supervised modalities after being cleared for contradictions: Pre-Mod Electrical Stimulation:  Patient received Pre-Mod Electrical Stimulation for pain control applied to the Left Knee. Pt received stimulation at a frequency of  Hz for 0 minutes. Patient tolerated treatment well without any adverse effects.       Home Exercises Provided and Patient Education Provided     Education provided: Reviewed his home exercise and stretching program and instructed him to stretch diligently at home    Written Home Exercises Provided: yes.  Exercises were reviewed and Rayray was able to demonstrate them prior to the end of the session.  Rayray demonstrated good  understanding of the education provided.     See EMR under Patient Instructions for exercises provided prior visit.    Assessment   Left knee ROM measurements on 6/21/2023   88 degrees LLE active range of motion knee flexion seated  95 degrees after NK table utilizing creep stretch    Patient's pain was a little better today.  He continues to have stiffness and decreased Range of Motion in the knee. Overall  his max Knee Flexion Range of Motion reached has been 95 degrees over the past few weeks. Therapist performed strengthening as well as stretching exercises to promote more normal motion in that Left Knee. Placed patient on the NK chair for a low load prolonged knee flexion stretch of 10 minutes with no rest break on this followed by open chain knee extension x 10 repetitions. He tolerated the treatment well, but knee flexion remained at 95 degrees at end of session. He reports he was pretty fatigued at conclusion of today's session. Will work toward completion of all goals set when Patient returns.     Range of Motion/Strength :   Left Extremity on 3/28/2023                                                           Left Extremity on 1/302023  AROM PROM Strength  Location  AROM    PROM   Strength     86   96  4/5   Knee    Flexion (140)  86  93  4+/5     -5  0  4/5                Extension (0)   0     4+/5         PMH from the Evaluation : Rayray is a 62 y.o. male referred to outpatient Physical Therapy with a medical diagnosis of Left Knee Pain. Rayray reports: He underwent Left TKA July 2019, Revision October 2020, and he has undergone 4 different Left Knee Manipulation and Scar tissue removal procedures. Last Manipulation was in 2021. He is here today due to continued Left Knee Pain and dysfunction. Left Knee presents with Flexion contracture and Left Knee significantly larger than the Right Knee. Patient presents with decreased Left Knee Flexion Range of Motion with hard end feel at end range. Left Knee is much larger than the Right most likely due to build up of scar tissue. Patient has difficulty with sit to stand especially from low chair. He shifts his weight to the Right in order to stand. Patient is noted to have decreased stance time and antalgic gait on the Left. Patient has decreased heel strike and hits foot flat with initial contact on the Left. He tends to keep the knee slightly flexed throughout gait  cycle. Patient has decreased step/stride length resulting in slow michelle.  Patient will require Physical Therapy Intervention to address all deficits and work toward completion of all goals set. Therapist will refer patient back to MD upon completion of Therapy for further assessment if pain and dysfunction persist.          Rayray Is progressing well towards his goals.   Pt prognosis is Good.     Pt will continue to benefit from skilled outpatient physical therapy to address the deficits listed in the problem list box on initial evaluation, provide pt/family education and to maximize pt's level of independence in the home and community environment.     Pt's spiritual, cultural and educational needs considered and pt agreeable to plan of care and goals.    Anticipated Barriers for therapy: Long Standing Left Knee Flexion Contracture     Goals:  Short Term Goals: 6 weeks   1. Independent with Home Exercise Program   2. Increase Left Knee Range of Motion to 0 Degrees to 90 Degrees  3. Increase Left Knee Strength to grossly 4+/5  4. Patient will ambulate 500 feet with stance time more equal from Left to Right with complaints of pain Less than or Equal to 4/10.       Long Term Goals: 12 weeks   1. Patient will increase Left Knee Strength to grossly 5/5  2. Increase Left Knee Range of Motion to 0 Degrees to 100 Degrees  3. Patient will ambulate 1000+ feet with Good Heel strike on the Left with complaints of pain Less than or Equal to 3/10.      Plan     Plan of care Certification: 3/21/2023 to 7/15/2023.     Outpatient Physical Therapy 2 times weekly for 12 weeks to include the following interventions: Electrical Stimulation to increase strength and decrease pain and inflammation as neeeded, Gait Training, Manual Therapy, Moist Heat/ Ice, Neuromuscular Re-ed, Patient Education, Therapeutic Activities, Therapeutic Exercise, and Ultrasound.        Lencho Forrester, PTA    6/21/2023          Due to scheduling conflict,  Patient did not return to Therapy following this treatment on 6/21/2023. Therefore patient is discharged from Physical Therapy services at current level of function as listed above.     RADHA GARCIA, PT, DPT

## 2025-03-31 NOTE — PROGRESS NOTES
Subjective:         Patient ID: Rayray Hutchins is a 65 y.o. male.    Chief Complaint: Hip Pain and Low-back Pain (rt)      Pain  This is a chronic problem. The current episode started more than 1 year ago. The problem occurs daily. The problem has been waxing and waning. Associated symptoms include arthralgias. Pertinent negatives include no change in bowel habit, chest pain, chills, coughing, fever, sore throat, vertigo or vomiting.     Review of Systems   Constitutional:  Negative for activity change, chills, fever and unexpected weight change.   HENT:  Negative for drooling, ear discharge, ear pain, facial swelling, nosebleeds, sore throat, trouble swallowing, voice change and goiter.    Eyes:  Negative for photophobia, pain, discharge, redness and visual disturbance.   Respiratory:  Negative for apnea, cough, choking, chest tightness, shortness of breath, wheezing and stridor.    Cardiovascular:  Negative for chest pain, palpitations and leg swelling.   Gastrointestinal:  Negative for abdominal distention, change in bowel habit, diarrhea, vomiting and fecal incontinence.   Endocrine: Negative for cold intolerance, heat intolerance, polydipsia, polyphagia and polyuria.   Genitourinary:  Negative for bladder incontinence, dysuria, flank pain and frequency.   Musculoskeletal:  Positive for arthralgias, back pain and leg pain.   Integumentary:  Negative for color change and pallor.   Neurological:  Negative for dizziness, vertigo, seizures, syncope, facial asymmetry, speech difficulty, light-headedness and coordination difficulties.   Hematological:  Negative for adenopathy. Does not bruise/bleed easily.   Psychiatric/Behavioral:  Negative for agitation, behavioral problems, confusion, decreased concentration, dysphoric mood, hallucinations and self-injury. The patient is not nervous/anxious and is not hyperactive.            Past Medical History:   Diagnosis Date    Allergy     Diabetes mellitus, type 2      Hypertension      Past Surgical History:   Procedure Laterality Date    KNEE SURGERY       Social History[1]  No family history on file.  Review of patient's allergies indicates:  No Known Allergies     Objective:  Vitals:    04/07/25 1311 04/07/25 1316   BP: 122/65    Pulse: 96    Resp: 20    Weight: 85.3 kg (188 lb)    Height: 6' (1.829 m)    PainSc:   8   8         Physical Exam  Vitals and nursing note reviewed. Exam conducted with a chaperone present.   Constitutional:       General: He is awake. He is not in acute distress.     Appearance: Normal appearance. He is not ill-appearing, toxic-appearing or diaphoretic.   HENT:      Head: Normocephalic and atraumatic.      Nose: Nose normal.      Mouth/Throat:      Mouth: Mucous membranes are moist.      Pharynx: Oropharynx is clear.   Eyes:      Conjunctiva/sclera: Conjunctivae normal.      Pupils: Pupils are equal, round, and reactive to light.   Cardiovascular:      Rate and Rhythm: Normal rate.   Pulmonary:      Effort: Pulmonary effort is normal. No respiratory distress.   Abdominal:      Palpations: Abdomen is soft.      Tenderness: There is no guarding.   Musculoskeletal:         General: Normal range of motion.      Cervical back: Normal range of motion and neck supple. No rigidity.   Skin:     General: Skin is warm and dry.      Coloration: Skin is not jaundiced or pale.   Neurological:      General: No focal deficit present.      Mental Status: He is alert and oriented to person, place, and time. Mental status is at baseline.      Cranial Nerves: No cranial nerve deficit (II-XII).   Psychiatric:         Mood and Affect: Mood normal.         Behavior: Behavior normal. Behavior is cooperative.         Thought Content: Thought content normal.           No image results found.       No visits with results within 6 Month(s) from this visit.   Latest known visit with results is:   No results found for any previous visit.         Orders Placed This Encounter    Procedures    X-Ray Lumbar Spine 2 Or 3 Views     Standing Status:   Future     Expected Date:   4/7/2025     Expiration Date:   7/7/2025     May the Radiologist modify the order per protocol to meet the clinical needs of the patient?:   Yes     Does the patient have a neck collar or brace on?:   No    X-Ray Hips Bilateral 2 View Inc AP Pelvis     Standing Status:   Future     Expected Date:   4/7/2025     Expiration Date:   4/7/2026     Does the patient have a splint or a brace?:   No     Does the patient have a cast?:   No     May the Radiologist modify the order per protocol to meet the clinical needs of the patient?:   Yes     Release to patient:   Immediate    MRI Lumbar Spine Without Contrast     Standing Status:   Future     Expected Date:   4/7/2025     Expiration Date:   4/7/2026     Does the patient have or ever had a pacemaker or a defibrillator (Note: Some facilities may not be able to schedule an MRI for patients with pacemakers and defibrillators. You should contact your local radiology dept to determine if this is the case.)?:   No     Does the patient have an aneurysm or surgical clip, pump, nerve/brain stimulator, middle/inner ear prosthesis, or other metal implant or foreign object (bullet, shrapnel)? If they have a card related to their implant, ask them to bring it. Issues related to the implant may cause the MRI to be delayed.:   No     Is the patient claustrophobic?:   No     Will the patient require po anxiolysis or sedation?:   No     May the Radiologist modify the order per protocol to meet the clinical needs of the patient?:   Yes     Is this part of a Research Study?:   No     Recist criteria?:   No     Will this service be billed to a Worker's Comp policy?:   No     Does the patient have on a skin patch for medication with aluminized backing?:   No     Does the patient have any of the following conditions? Diabetes, History of Renal Disease or Hypertension requiring medical therapy?:    No       Requested Prescriptions      No prescriptions requested or ordered in this encounter       Assessment:     1. Lumbar radiculopathy, chronic    2. Dorsalgia, unspecified    3. Primary osteoarthritis involving multiple joints         A's of Opioid Risk Assessment  Activity:  Current medication helps perform ADL.   Analgesia:Patients pain is partially controlled by current medication. Patient has tried OTC medications such as Tylenol and Ibuprofen with out relief.   Adverse Effects: Patient denies constipation or sedation.  Aberrant Behavior:  reviewed with no aberrant drug seeking/taking behavior.  Overdose reversal drug naloxone discussed    Drug screen reviewed      Plan:    New patient    [facility] patient    Self-referral    Was seen in [facility] many years ago had back injections he is unsure what kind of injection    Dr Saleh, 2016  Notes placed under media    No current labs March 31, 2025    No x-ray/MRI March 31, 2025 April 7, 2025    Patient presents clinic today complaining back pain buttock and leg pain numbness and tingling radicular in nature ongoing for many years he states he had epidural steroid injections several years ago which did help he has consider repeating epidural steroid injection     He describes a daily dull pain numbness and tingling radicular in nature worse with increased activity better with short periods resting or changing positions     He denies any recent illness or injury     Denies loss of bowel or bladder function     He is neurologically intact     After discussing options risks benefits will proceed with     X-ray lumbar spine pelvis and hips     MRI lumbar spine lumbar radiculopathy  MRI for consideration procedure/surgery    I have given the patient medically directed home exercise program    Patient has tried NSAIDs and neuromodulators (neurontin, lyrica, elavil, or cymbalta)    Follow-up after above studies discuss options    Dr. Carney  next visit    Bring original prescription medication bottles/container/box with labels to each visit    Greater than 30 minutes spent on this encounter including 10 minutes reviewing imaging and notes, 15 minutes with the patient, 5 minutes documentation                [1]   Social History  Socioeconomic History    Marital status: Single   Tobacco Use    Smoking status: Never    Smokeless tobacco: Never   Substance and Sexual Activity    Alcohol use: Not Currently

## 2025-04-07 ENCOUNTER — OFFICE VISIT (OUTPATIENT)
Dept: PAIN MEDICINE | Facility: CLINIC | Age: 65
End: 2025-04-07
Payer: MEDICARE

## 2025-04-07 VITALS
WEIGHT: 188 LBS | BODY MASS INDEX: 25.47 KG/M2 | RESPIRATION RATE: 20 BRPM | HEIGHT: 72 IN | HEART RATE: 96 BPM | DIASTOLIC BLOOD PRESSURE: 65 MMHG | SYSTOLIC BLOOD PRESSURE: 122 MMHG

## 2025-04-07 DIAGNOSIS — M54.16 LUMBAR RADICULOPATHY, CHRONIC: Primary | Chronic | ICD-10-CM

## 2025-04-07 DIAGNOSIS — M54.9 DORSALGIA, UNSPECIFIED: Chronic | ICD-10-CM

## 2025-04-07 DIAGNOSIS — M15.0 PRIMARY OSTEOARTHRITIS INVOLVING MULTIPLE JOINTS: Chronic | ICD-10-CM

## 2025-04-07 PROCEDURE — 99214 OFFICE O/P EST MOD 30 MIN: CPT | Mod: PBBFAC | Performed by: PHYSICIAN ASSISTANT

## 2025-04-07 PROCEDURE — 99999 PR PBB SHADOW E&M-EST. PATIENT-LVL IV: CPT | Mod: PBBFAC,,, | Performed by: PHYSICIAN ASSISTANT

## 2025-04-07 PROCEDURE — 99204 OFFICE O/P NEW MOD 45 MIN: CPT | Mod: S$PBB,,, | Performed by: PHYSICIAN ASSISTANT

## 2025-04-07 RX ORDER — GABAPENTIN 100 MG/1
100 CAPSULE ORAL 3 TIMES DAILY
COMMUNITY

## 2025-04-21 ENCOUNTER — HOSPITAL ENCOUNTER (OUTPATIENT)
Dept: RADIOLOGY | Facility: HOSPITAL | Age: 65
Discharge: HOME | End: 2025-04-21
Attending: PHYSICIAN ASSISTANT
Payer: OTHER GOVERNMENT

## 2025-04-21 DIAGNOSIS — M54.9 DORSALGIA, UNSPECIFIED: Chronic | ICD-10-CM

## 2025-04-21 PROCEDURE — 72100 X-RAY EXAM L-S SPINE 2/3 VWS: CPT | Mod: 26,,, | Performed by: RADIOLOGY

## 2025-04-21 PROCEDURE — 72100 X-RAY EXAM L-S SPINE 2/3 VWS: CPT | Mod: TC

## 2025-05-13 NOTE — PROGRESS NOTES
Subjective:         Patient ID: Rayray Hutchins is a 65 y.o. male.    Chief Complaint: Back Pain      Pain  This is a chronic problem. The current episode started more than 1 year ago. The problem occurs daily. The problem has been unchanged. Associated symptoms include arthralgias. Pertinent negatives include no change in bowel habit, chest pain, chills, coughing, fever, sore throat, vertigo or vomiting.     Review of Systems   Constitutional:  Negative for activity change, chills, fever and unexpected weight change.   HENT:  Negative for drooling, ear discharge, ear pain, facial swelling, nosebleeds, sore throat, trouble swallowing, voice change and goiter.    Eyes:  Negative for photophobia, pain, discharge, redness and visual disturbance.   Respiratory:  Negative for apnea, cough, choking, chest tightness, shortness of breath, wheezing and stridor.    Cardiovascular:  Negative for chest pain, palpitations and leg swelling.   Gastrointestinal:  Negative for abdominal distention, change in bowel habit, diarrhea, vomiting and fecal incontinence.   Endocrine: Negative for cold intolerance, heat intolerance, polydipsia, polyphagia and polyuria.   Genitourinary:  Negative for bladder incontinence, dysuria, flank pain and frequency.   Musculoskeletal:  Positive for arthralgias, back pain and leg pain.   Integumentary:  Negative for color change and pallor.   Neurological:  Negative for dizziness, vertigo, seizures, syncope, facial asymmetry, speech difficulty, light-headedness, coordination difficulties and memory loss.   Hematological:  Negative for adenopathy. Does not bruise/bleed easily.   Psychiatric/Behavioral:  Negative for agitation, behavioral problems, confusion, decreased concentration, dysphoric mood, hallucinations and self-injury. The patient is not nervous/anxious and is not hyperactive.            Past Medical History:   Diagnosis Date    Allergy     Diabetes mellitus, type 2     Hypertension       Past Surgical History:   Procedure Laterality Date    KNEE SURGERY       Social History[1]  No family history on file.  Review of patient's allergies indicates:  No Known Allergies     Objective:  Vitals:    05/20/25 1315 05/20/25 1316   BP: 124/64    Pulse: 92    Resp: 20    Weight: 84.8 kg (187 lb)    Height: 6' (1.829 m)    PainSc:   6   6           Physical Exam  Vitals and nursing note reviewed. Exam conducted with a chaperone present.   Constitutional:       General: He is awake. He is not in acute distress.     Appearance: Normal appearance. He is not ill-appearing, toxic-appearing or diaphoretic.   HENT:      Head: Normocephalic and atraumatic.      Nose: Nose normal.      Mouth/Throat:      Mouth: Mucous membranes are moist.      Pharynx: Oropharynx is clear.   Eyes:      Conjunctiva/sclera: Conjunctivae normal.      Pupils: Pupils are equal, round, and reactive to light.   Cardiovascular:      Rate and Rhythm: Normal rate.   Pulmonary:      Effort: Pulmonary effort is normal. No respiratory distress.   Abdominal:      Palpations: Abdomen is soft.      Tenderness: There is no guarding.   Musculoskeletal:         General: Normal range of motion.      Cervical back: Normal range of motion and neck supple. No rigidity.      Thoracic back: Tenderness present.      Lumbar back: Tenderness present.   Skin:     General: Skin is warm and dry.      Coloration: Skin is not jaundiced or pale.   Neurological:      General: No focal deficit present.      Mental Status: He is alert and oriented to person, place, and time. Mental status is at baseline.      Cranial Nerves: No cranial nerve deficit (II-XII).   Psychiatric:         Mood and Affect: Mood normal.         Behavior: Behavior normal. Behavior is cooperative.         Thought Content: Thought content normal.           MRI Lumbar Spine Without Contrast  Narrative: EXAMINATION:  MRI LUMBAR SPINE WITHOUT CONTRAST    CLINICAL HISTORY:  Lumbar radiculopathy,  symptoms persist with conservative treatment; Radiculopathy, lumbar region    TECHNIQUE:  Sagittal T1, T2, stir and axial T1-T2 imaging of the lumbar spine without contrast.    COMPARISON:  Lumbar radiograph 04/21/2025    FINDINGS:  Straightening of the lumbar lordosis similar to prior.  Lumbar vertebral body heights, and contours are relatively stable without evidence for acute fracture or subluxation.  There is degenerative disc disease with height loss slight disc desiccation and endplate degenerative change most pronounced in the L5/S1 level with Modic type 2 endplate degeneration.    The distal spinal cord and conus is normal in signal and contour tip of the conus approximates the superior L2 level.    No aneurysmal dilatation of the visualized abdominal aorta.  Subcentimeter fluid signal focus in the left kidney suggestive for renal cyst.    T12/L1 through L1/L2: No significant disc bulge, central canal or neural foraminal stenosis.    L2/L3: No significant disc bulge central canal or neural foraminal stenosis.    L3/L4:No significant disc bulge, central canal or neural foraminal stenosis.    L4/L5:Posterior disc osteophyte with ligamentum flavum hypertrophy and facet arthropathy with dorsal epidural lipomatosis with mild moderate central canal stenosis and bilateral neural foraminal stenosis.    L5/S1: Posterior disc osteophyte with facet joint arthropathy without significant central canal stenosis and severe right and moderate to severe left neural foraminal stenosis  Impression: Degenerative change lower lumbar spine as detailed above most pronounced at L4/L5 with posterior disc osteophyte, ligamentum flavum hypertrophy and facet arthropathy mild moderate central canal stenosis and bilateral neural foraminal stenosis.  Please note there is greater degree of neural foraminal stenosis at L5/S1 with severe right and moderate to severe left neural foraminal stenosis.    Electronically signed by: Estiven Burleson  DO  Date:    05/09/2025  Time:    15:02       No visits with results within 6 Month(s) from this visit.   Latest known visit with results is:   No results found for any previous visit.         Orders Placed This Encounter   Procedures    Case Request Operating Room: Injection-steroid-epidural-lumbar L4/5     Medical Necessity::   Medically Non-Urgent [100]     Case classification:   E - Elective [90]     Is an on-site pathologist required for this procedure?:   N/A       Requested Prescriptions      No prescriptions requested or ordered in this encounter       Assessment:     1. Lumbar radiculopathy, chronic    2. Primary osteoarthritis involving multiple joints             X-ray lumbar spine [facility] April 21, 2025  Mild broad dextrocurvature. Sagittal alignment is within normal limits. Presumed lumbosacral transitional anatomy with sacralization of L5. Mild degenerative disc height loss most pronounced at L4-L5 with shallow endplate centered osteophytes. No fractures.       MRI lumbar spine [facility] May 9, 2025  FINDINGS:  Straightening of the lumbar lordosis similar to prior.  Lumbar vertebral body heights, and contours are relatively stable without evidence for acute fracture or subluxation.  There is degenerative disc disease with height loss slight disc desiccation and endplate degenerative change most pronounced in the L5/S1 level with Modic type 2 endplate degeneration.     The distal spinal cord and conus is normal in signal and contour tip of the conus approximates the superior L2 level.     No aneurysmal dilatation of the visualized abdominal aorta.  Subcentimeter fluid signal focus in the left kidney suggestive for renal cyst.     T12/L1 through L1/L2: No significant disc bulge, central canal or neural foraminal stenosis.     L2/L3: No significant disc bulge central canal or neural foraminal stenosis.     L3/L4:No significant disc bulge, central canal or neural foraminal stenosis.      L4/L5:Posterior disc osteophyte with ligamentum flavum hypertrophy and facet arthropathy with dorsal epidural lipomatosis with mild moderate central canal stenosis and bilateral neural foraminal stenosis.     L5/S1: Posterior disc osteophyte with facet joint arthropathy without significant central canal stenosis and severe right and moderate to severe left neural foraminal stenosis     Impression:     Degenerative change lower lumbar spine as detailed above most pronounced at L4/L5 with posterior disc osteophyte, ligamentum flavum hypertrophy and facet arthropathy mild moderate central canal stenosis and bilateral neural foraminal stenosis.  Please note there is greater degree of neural foraminal stenosis at L5/S1 with severe right and moderate to severe left neural foraminal stenosis.       Plan:    Not using narcotics from our office May 2025    [facility] patient    Was seen in [facility] many years ago had back injections he is unsure what kind of injection  Dr Saleh, 2016      Seen as new patient April 7, 2025    Follow-up after MRI lumbar spine x-ray lumbar spine    X-ray pelvis and hips not completed ordered April 7, 2025 he states [facility] was worried [facility] not [facility]      Complaining back pain buttock and leg pain numbness and tingling radicular in nature ongoing for many years he states he had epidural steroid injections several years ago which did help he has consider repeating epidural steroid injection     Requesting procedure for lumbar radiculopathy symptoms    He states he will discuss with his [facility]    Patient is not able to perform activities daily living due to pain such as house chores grocery shopping cooking and cleaning personal hygiene patient can not stand, sit, walk ride for more than 15 minutes at a time due to pain    Patient will/shall continue home exercise program as directed ongoing x1 year, 3-4 days per week 30-45  minutes per session  Patient has been doing home exercises since last procedure Date --------  Stretching Exercises   Stretching exercises keep your muscles flexible. They also stop them from getting tight. Start by doing each of these stretches 2 to 3 times. In order for your body to make changes, you will need to hold these stretches for 20 to 30 seconds. Try to do the stretches 2 to 3 times each day.   Do all exercises slowly.   Do not bounce when doing stretches.    Single knee to chest stretches ? Lie on your back, bend your knees and have your feet flat on the floor. Pull one knee towards your chest until you feel a stretch in your lower back and buttock area. Repeat with the other knee. If you have knee problems, pull your knee up by grabbing the back of your thigh instead of the front of your knee. You can also do this exercise by grabbing both knees at the same time.    Lower trunk rotations ? While lying on your back, bend your knees and have your feet flat on the floor. Keep your legs together and then drop them to one side. Be sure to keep both of your shoulders touching the floor until you feel a stretch in the muscles at the side of the back. Repeat on the other side.    Lower back stretches seated ? Sit in a chair with your feet spread about shoulder width apart. Then, lean forward until you feel a stretch in your lower back.  Strengthening Exercises   Strengthening exercises keep your muscles firm and strong. Start by repeating each exercise 2 to 3 times.     Work up to doing each exercise 10 times.     Hold each exercise for 3 to 5 seconds. Try to do the exercises 2 to 3 times each day.     Do all exercises slowly.    Shoulder blade squeezes ? Pinch your shoulder blades together on your upper back and hold 3 to 5 seconds. Be sure you are sitting with good posture and make sure your shoulders do not raise up when you do this exercise. Relax.    Pelvic tilts ? Lie on your back with your knees bent and  feet flat on the floor. Tighten your stomach muscles and press your lower back down to the floor. Relax.    Hip lifts ? Lie on your back with your knees bent and feet flat on the floor. Tighten your stomach muscles and lift your buttocks off the floor. Relax.    Please see detailed exercises attached to note with diagram    Indications for this procedure for this specific patient include the following     - Injections being provided as part of a comprehensive pain management program.    - Pt has failed 6 weeks of conservative therapy including oral meds, PT and or home exercise program which has been discussed with the patient   - Injection being provided for suspected radicular pain.    - Pain scale of greater than or equal to 3/10 with functional impairment  - No evidence of local or systemic infection, bleeding tendency or unstable medical condition.    - Pain is causing significant functional limitation resulting in diminished quality of life and impaired age appropriate ADL's.   - Repeat injections are done no sooner than 7 days after the previous injection  - Epidural done for suspected radicular pain along dermatome of nerve   - Epidural done to differentiate level of radicular nerve root pain   -procedure done prior to surgical consideration  - Repeat injections done only when pt reports 50% improvement in pain from previous injections    -increased level of function  - patient is aware if they take any NSAIDs/anticoagulant prior to procedure procedure will be postponed, this was listed on the preop instructions and highlighted, list of NSAIDs/anticoagulant reviewed with patient to include methotrexate  - Injection done at L4-5 level(s) which is consistent with patient's dermatomal pain complaint  The planned medically necessary  surgical procedure is performed in a hospital outpatient department and not in an ambulatory surgical center due to:     -there is no geographically assessable ambulatory surgery  center that has the  necessary equipment and fluoroscopy needed for the procedure     -there is no geographically assessable ambulatory surgical center available at which the physician has privileges     -an ASC's  specific  guideline regarding the individuals weight or health conditions that prevent the use of an ASC     -done under fluoro  Monitor anesthesia request is medically indicated for the scheduled nerve block procedure due to:  1- needle phobia and anxiety, placing  the patient at risk during the provided service.  2-patient has an ASA class greater than 3 and requires constant presence of an anesthesiologist during the procedure,   3-patient has severe problems hard to lie still  4-patient suffers from chronic pain and is unable to function due to  diminished ADLs    Schedule lumbar L4/5 ZOILA # 1 lumbar radiculopathy    Dr. Carney     Bring original prescription medication bottles/container/box with labels to each visit                  [1]   Social History  Socioeconomic History    Marital status: Single   Tobacco Use    Smoking status: Never    Smokeless tobacco: Never   Substance and Sexual Activity    Alcohol use: Not Currently

## 2025-05-13 NOTE — H&P (VIEW-ONLY)
Subjective:         Patient ID: Rayray Hutchins is a 65 y.o. male.    Chief Complaint: Back Pain      Pain  This is a chronic problem. The current episode started more than 1 year ago. The problem occurs daily. The problem has been unchanged. Associated symptoms include arthralgias. Pertinent negatives include no change in bowel habit, chest pain, chills, coughing, fever, sore throat, vertigo or vomiting.     Review of Systems   Constitutional:  Negative for activity change, chills, fever and unexpected weight change.   HENT:  Negative for drooling, ear discharge, ear pain, facial swelling, nosebleeds, sore throat, trouble swallowing, voice change and goiter.    Eyes:  Negative for photophobia, pain, discharge, redness and visual disturbance.   Respiratory:  Negative for apnea, cough, choking, chest tightness, shortness of breath, wheezing and stridor.    Cardiovascular:  Negative for chest pain, palpitations and leg swelling.   Gastrointestinal:  Negative for abdominal distention, change in bowel habit, diarrhea, vomiting and fecal incontinence.   Endocrine: Negative for cold intolerance, heat intolerance, polydipsia, polyphagia and polyuria.   Genitourinary:  Negative for bladder incontinence, dysuria, flank pain and frequency.   Musculoskeletal:  Positive for arthralgias, back pain and leg pain.   Integumentary:  Negative for color change and pallor.   Neurological:  Negative for dizziness, vertigo, seizures, syncope, facial asymmetry, speech difficulty, light-headedness, coordination difficulties and memory loss.   Hematological:  Negative for adenopathy. Does not bruise/bleed easily.   Psychiatric/Behavioral:  Negative for agitation, behavioral problems, confusion, decreased concentration, dysphoric mood, hallucinations and self-injury. The patient is not nervous/anxious and is not hyperactive.            Past Medical History:   Diagnosis Date    Allergy     Diabetes mellitus, type 2     Hypertension       Past Surgical History:   Procedure Laterality Date    KNEE SURGERY       Social History[1]  No family history on file.  Review of patient's allergies indicates:  No Known Allergies     Objective:  Vitals:    05/20/25 1315 05/20/25 1316   BP: 124/64    Pulse: 92    Resp: 20    Weight: 84.8 kg (187 lb)    Height: 6' (1.829 m)    PainSc:   6   6           Physical Exam  Vitals and nursing note reviewed. Exam conducted with a chaperone present.   Constitutional:       General: He is awake. He is not in acute distress.     Appearance: Normal appearance. He is not ill-appearing, toxic-appearing or diaphoretic.   HENT:      Head: Normocephalic and atraumatic.      Nose: Nose normal.      Mouth/Throat:      Mouth: Mucous membranes are moist.      Pharynx: Oropharynx is clear.   Eyes:      Conjunctiva/sclera: Conjunctivae normal.      Pupils: Pupils are equal, round, and reactive to light.   Cardiovascular:      Rate and Rhythm: Normal rate.   Pulmonary:      Effort: Pulmonary effort is normal. No respiratory distress.   Abdominal:      Palpations: Abdomen is soft.      Tenderness: There is no guarding.   Musculoskeletal:         General: Normal range of motion.      Cervical back: Normal range of motion and neck supple. No rigidity.      Thoracic back: Tenderness present.      Lumbar back: Tenderness present.   Skin:     General: Skin is warm and dry.      Coloration: Skin is not jaundiced or pale.   Neurological:      General: No focal deficit present.      Mental Status: He is alert and oriented to person, place, and time. Mental status is at baseline.      Cranial Nerves: No cranial nerve deficit (II-XII).   Psychiatric:         Mood and Affect: Mood normal.         Behavior: Behavior normal. Behavior is cooperative.         Thought Content: Thought content normal.           MRI Lumbar Spine Without Contrast  Narrative: EXAMINATION:  MRI LUMBAR SPINE WITHOUT CONTRAST    CLINICAL HISTORY:  Lumbar radiculopathy,  symptoms persist with conservative treatment; Radiculopathy, lumbar region    TECHNIQUE:  Sagittal T1, T2, stir and axial T1-T2 imaging of the lumbar spine without contrast.    COMPARISON:  Lumbar radiograph 04/21/2025    FINDINGS:  Straightening of the lumbar lordosis similar to prior.  Lumbar vertebral body heights, and contours are relatively stable without evidence for acute fracture or subluxation.  There is degenerative disc disease with height loss slight disc desiccation and endplate degenerative change most pronounced in the L5/S1 level with Modic type 2 endplate degeneration.    The distal spinal cord and conus is normal in signal and contour tip of the conus approximates the superior L2 level.    No aneurysmal dilatation of the visualized abdominal aorta.  Subcentimeter fluid signal focus in the left kidney suggestive for renal cyst.    T12/L1 through L1/L2: No significant disc bulge, central canal or neural foraminal stenosis.    L2/L3: No significant disc bulge central canal or neural foraminal stenosis.    L3/L4:No significant disc bulge, central canal or neural foraminal stenosis.    L4/L5:Posterior disc osteophyte with ligamentum flavum hypertrophy and facet arthropathy with dorsal epidural lipomatosis with mild moderate central canal stenosis and bilateral neural foraminal stenosis.    L5/S1: Posterior disc osteophyte with facet joint arthropathy without significant central canal stenosis and severe right and moderate to severe left neural foraminal stenosis  Impression: Degenerative change lower lumbar spine as detailed above most pronounced at L4/L5 with posterior disc osteophyte, ligamentum flavum hypertrophy and facet arthropathy mild moderate central canal stenosis and bilateral neural foraminal stenosis.  Please note there is greater degree of neural foraminal stenosis at L5/S1 with severe right and moderate to severe left neural foraminal stenosis.    Electronically signed by: Estiven Burleson  DO  Date:    05/09/2025  Time:    15:02       No visits with results within 6 Month(s) from this visit.   Latest known visit with results is:   No results found for any previous visit.         Orders Placed This Encounter   Procedures    Case Request Operating Room: Injection-steroid-epidural-lumbar L4/5     Medical Necessity::   Medically Non-Urgent [100]     Case classification:   E - Elective [90]     Is an on-site pathologist required for this procedure?:   N/A       Requested Prescriptions      No prescriptions requested or ordered in this encounter       Assessment:     1. Lumbar radiculopathy, chronic    2. Primary osteoarthritis involving multiple joints             X-ray lumbar spine [facility] April 21, 2025  Mild broad dextrocurvature. Sagittal alignment is within normal limits. Presumed lumbosacral transitional anatomy with sacralization of L5. Mild degenerative disc height loss most pronounced at L4-L5 with shallow endplate centered osteophytes. No fractures.       MRI lumbar spine [facility] May 9, 2025  FINDINGS:  Straightening of the lumbar lordosis similar to prior.  Lumbar vertebral body heights, and contours are relatively stable without evidence for acute fracture or subluxation.  There is degenerative disc disease with height loss slight disc desiccation and endplate degenerative change most pronounced in the L5/S1 level with Modic type 2 endplate degeneration.     The distal spinal cord and conus is normal in signal and contour tip of the conus approximates the superior L2 level.     No aneurysmal dilatation of the visualized abdominal aorta.  Subcentimeter fluid signal focus in the left kidney suggestive for renal cyst.     T12/L1 through L1/L2: No significant disc bulge, central canal or neural foraminal stenosis.     L2/L3: No significant disc bulge central canal or neural foraminal stenosis.     L3/L4:No significant disc bulge, central canal or neural foraminal stenosis.      L4/L5:Posterior disc osteophyte with ligamentum flavum hypertrophy and facet arthropathy with dorsal epidural lipomatosis with mild moderate central canal stenosis and bilateral neural foraminal stenosis.     L5/S1: Posterior disc osteophyte with facet joint arthropathy without significant central canal stenosis and severe right and moderate to severe left neural foraminal stenosis     Impression:     Degenerative change lower lumbar spine as detailed above most pronounced at L4/L5 with posterior disc osteophyte, ligamentum flavum hypertrophy and facet arthropathy mild moderate central canal stenosis and bilateral neural foraminal stenosis.  Please note there is greater degree of neural foraminal stenosis at L5/S1 with severe right and moderate to severe left neural foraminal stenosis.       Plan:    Not using narcotics from our office May 2025    [facility] patient    Was seen in [facility] many years ago had back injections he is unsure what kind of injection  Dr Saleh, 2016      Seen as new patient April 7, 2025    Follow-up after MRI lumbar spine x-ray lumbar spine    X-ray pelvis and hips not completed ordered April 7, 2025 he states he was worried [facility] might not [facility]      Complaining back pain buttock and leg pain numbness and tingling radicular in nature ongoing for many years he states he had epidural steroid injections several years ago which did help he has consider repeating epidural steroid injection     Requesting procedure for lumbar radiculopathy symptoms    He states he will discuss with his primary [facility] provider [facility]    Patient is not able to perform activities daily living due to pain such as house chores grocery shopping cooking and cleaning personal hygiene patient can not stand, sit, walk ride for more than 15 minutes at a time due to pain    Patient will/shall continue home exercise program as directed ongoing x1 year, 3-4 days per week 30-45  minutes per session  Patient has been doing home exercises since last procedure Date --------  Stretching Exercises   Stretching exercises keep your muscles flexible. They also stop them from getting tight. Start by doing each of these stretches 2 to 3 times. In order for your body to make changes, you will need to hold these stretches for 20 to 30 seconds. Try to do the stretches 2 to 3 times each day.   Do all exercises slowly.   Do not bounce when doing stretches.    Single knee to chest stretches ? Lie on your back, bend your knees and have your feet flat on the floor. Pull one knee towards your chest until you feel a stretch in your lower back and buttock area. Repeat with the other knee. If you have knee problems, pull your knee up by grabbing the back of your thigh instead of the front of your knee. You can also do this exercise by grabbing both knees at the same time.    Lower trunk rotations ? While lying on your back, bend your knees and have your feet flat on the floor. Keep your legs together and then drop them to one side. Be sure to keep both of your shoulders touching the floor until you feel a stretch in the muscles at the side of the back. Repeat on the other side.    Lower back stretches seated ? Sit in a chair with your feet spread about shoulder width apart. Then, lean forward until you feel a stretch in your lower back.  Strengthening Exercises   Strengthening exercises keep your muscles firm and strong. Start by repeating each exercise 2 to 3 times.     Work up to doing each exercise 10 times.     Hold each exercise for 3 to 5 seconds. Try to do the exercises 2 to 3 times each day.     Do all exercises slowly.    Shoulder blade squeezes ? Pinch your shoulder blades together on your upper back and hold 3 to 5 seconds. Be sure you are sitting with good posture and make sure your shoulders do not raise up when you do this exercise. Relax.    Pelvic tilts ? Lie on your back with your knees bent and  feet flat on the floor. Tighten your stomach muscles and press your lower back down to the floor. Relax.    Hip lifts ? Lie on your back with your knees bent and feet flat on the floor. Tighten your stomach muscles and lift your buttocks off the floor. Relax.    Please see detailed exercises attached to note with diagram    Indications for this procedure for this specific patient include the following     - Injections being provided as part of a comprehensive pain management program.    - Pt has failed 6 weeks of conservative therapy including oral meds, PT and or home exercise program which has been discussed with the patient   - Injection being provided for suspected radicular pain.    - Pain scale of greater than or equal to 3/10 with functional impairment  - No evidence of local or systemic infection, bleeding tendency or unstable medical condition.    - Pain is causing significant functional limitation resulting in diminished quality of life and impaired age appropriate ADL's.   - Repeat injections are done no sooner than 7 days after the previous injection  - Epidural done for suspected radicular pain along dermatome of nerve   - Epidural done to differentiate level of radicular nerve root pain   -procedure done prior to surgical consideration  - Repeat injections done only when pt reports 50% improvement in pain from previous injections    -increased level of function  - patient is aware if they take any NSAIDs/anticoagulant prior to procedure procedure will be postponed, this was listed on the preop instructions and highlighted, list of NSAIDs/anticoagulant reviewed with patient to include methotrexate  - Injection done at L4-5 level(s) which is consistent with patient's dermatomal pain complaint  The planned medically necessary  surgical procedure is performed in a hospital outpatient department and not in an ambulatory surgical center due to:     -there is no geographically assessable ambulatory surgery  center that has the  necessary equipment and fluoroscopy needed for the procedure     -there is no geographically assessable ambulatory surgical center available at which the physician has privileges     -an ASC's  specific  guideline regarding the individuals weight or health conditions that prevent the use of an ASC     -done under fluoro  Monitor anesthesia request is medically indicated for the scheduled nerve block procedure due to:  1- needle phobia and anxiety, placing  the patient at risk during the provided service.  2-patient has an ASA class greater than 3 and requires constant presence of an anesthesiologist during the procedure,   3-patient has severe problems hard to lie still  4-patient suffers from chronic pain and is unable to function due to  diminished ADLs    Schedule lumbar L4/5 ZOILA # 1 lumbar radiculopathy    Dr. Carney     Bring original prescription medication bottles/container/box with labels to each visit                  [1]   Social History  Socioeconomic History    Marital status: Single   Tobacco Use    Smoking status: Never    Smokeless tobacco: Never   Substance and Sexual Activity    Alcohol use: Not Currently

## 2025-05-20 ENCOUNTER — OFFICE VISIT (OUTPATIENT)
Dept: PAIN MEDICINE | Facility: CLINIC | Age: 65
End: 2025-05-20
Payer: OTHER GOVERNMENT

## 2025-05-20 VITALS
DIASTOLIC BLOOD PRESSURE: 64 MMHG | RESPIRATION RATE: 20 BRPM | BODY MASS INDEX: 25.33 KG/M2 | HEART RATE: 92 BPM | HEIGHT: 72 IN | WEIGHT: 187 LBS | SYSTOLIC BLOOD PRESSURE: 124 MMHG

## 2025-05-20 DIAGNOSIS — M15.0 PRIMARY OSTEOARTHRITIS INVOLVING MULTIPLE JOINTS: Chronic | ICD-10-CM

## 2025-05-20 DIAGNOSIS — M54.16 LUMBAR RADICULOPATHY, CHRONIC: Primary | Chronic | ICD-10-CM

## 2025-05-20 PROCEDURE — 99213 OFFICE O/P EST LOW 20 MIN: CPT | Mod: S$PBB,,, | Performed by: PHYSICIAN ASSISTANT

## 2025-05-20 PROCEDURE — 99215 OFFICE O/P EST HI 40 MIN: CPT | Mod: PBBFAC | Performed by: PHYSICIAN ASSISTANT

## 2025-05-20 PROCEDURE — 99999 PR PBB SHADOW E&M-EST. PATIENT-LVL V: CPT | Mod: PBBFAC,,, | Performed by: PHYSICIAN ASSISTANT

## 2025-05-20 RX ORDER — TADALAFIL 20 MG/1
20 TABLET ORAL
COMMUNITY
Start: 2024-08-16

## 2025-05-20 RX ORDER — PRAVASTATIN SODIUM 40 MG/1
20 TABLET ORAL
COMMUNITY
Start: 2024-08-08

## 2025-05-20 RX ORDER — CETIRIZINE HYDROCHLORIDE 10 MG/1
10 TABLET ORAL
COMMUNITY
Start: 2024-08-08

## 2025-05-20 RX ORDER — AMLODIPINE BESYLATE 5 MG/1
5 TABLET ORAL
COMMUNITY
Start: 2025-03-06

## 2025-05-20 RX ORDER — METFORMIN HYDROCHLORIDE 1000 MG/1
1000 TABLET ORAL
COMMUNITY
Start: 2025-02-04

## 2025-05-20 RX ORDER — ASPIRIN 81 MG/1
81 TABLET ORAL
COMMUNITY
Start: 2025-04-24

## 2025-05-20 RX ORDER — TAMSULOSIN HYDROCHLORIDE 0.4 MG/1
0.4 CAPSULE ORAL
COMMUNITY
Start: 2024-08-16

## 2025-05-20 RX ORDER — ACETAMINOPHEN 325 MG/1
650 TABLET ORAL
COMMUNITY
Start: 2025-04-24

## 2025-05-20 RX ORDER — LISINOPRIL 40 MG/1
20 TABLET ORAL
COMMUNITY
Start: 2025-04-24

## 2025-05-20 NOTE — PATIENT INSTRUCTIONS

## 2025-06-09 ENCOUNTER — TELEPHONE (OUTPATIENT)
Dept: PAIN MEDICINE | Facility: CLINIC | Age: 65
End: 2025-06-09
Payer: OTHER GOVERNMENT

## 2025-06-09 NOTE — TELEPHONE ENCOUNTER
Attempted to call patient to remind him of procedure appt with Dr Carney for Whit 10 @ [id number]. No answer

## 2025-06-10 ENCOUNTER — HOSPITAL ENCOUNTER (OUTPATIENT)
Facility: HOSPITAL | Age: 65
Discharge: HOME | End: 2025-06-10
Attending: PAIN MEDICINE | Admitting: PAIN MEDICINE
Payer: OTHER GOVERNMENT

## 2025-06-10 VITALS
TEMPERATURE: 98 F | BODY MASS INDEX: 24.92 KG/M2 | DIASTOLIC BLOOD PRESSURE: 63 MMHG | WEIGHT: 184 LBS | RESPIRATION RATE: 16 BRPM | HEART RATE: 62 BPM | SYSTOLIC BLOOD PRESSURE: 105 MMHG | OXYGEN SATURATION: 99 % | HEIGHT: 72 IN

## 2025-06-10 DIAGNOSIS — M54.16 LUMBAR RADICULOPATHY: ICD-10-CM

## 2025-06-10 LAB — GLUCOSE SERPL-MCNC: 118 MG/DL (ref 70–105)

## 2025-06-10 PROCEDURE — 82962 GLUCOSE BLOOD TEST: CPT

## 2025-06-10 RX ORDER — SODIUM CHLORIDE 9 MG/ML
INJECTION, SOLUTION INTRAVENOUS CONTINUOUS
Status: DISCONTINUED | OUTPATIENT
Start: 2025-06-10 | End: 2025-06-10 | Stop reason: HOSPADM

## 2025-06-10 NOTE — DISCHARGE SUMMARY
[facility] - Pain Management  Discharge Note  Short Stay    Procedure(s) (LRB):  Injection-steroid-epidural-lumbar L4/5 (N/A)      OUTCOME: Procedure canceled per patient    DISPOSITION: Home or Self Care    FINAL DIAGNOSIS:  Lumbar radiculopathy    FOLLOWUP: In clinic    DISCHARGE INSTRUCTIONS:  See nurse's notes     TIME SPENT ON DISCHARGE: 5 minutes

## 2025-06-10 NOTE — PLAN OF CARE
Patient's procedure not performed due to IV infiltrating and patient did not want to complete procedure without sedation. Will reschedule procedure.

## 2025-06-10 NOTE — BRIEF OP NOTE
The  Discharge Note  Short Stay    Admit Date: 6/10/2025    Discharge Date: 6/10/2025    Attending Physician: Sissy Carney     Discharge Provider: Sissy Carney    Diagnosis:  Lumbar radiculopathy    Procedure performed:  Procedure canceled due to lack of IV access and patient refusal of local anesthesia        Discharged Condition: Good    Final Diagnoses: Lumbar radiculopathy, chronic [M54.16]    Disposition: Home or Self Care    Hospital Course: No complications, uneventful    Outcome of Hospitalization, Treatment, Procedure, or Surgery:  Patient was admitted for outpatient interventional pain management procedure. Procedure canceled per patient due to lack of IV access.    Follow up/Patient Instructions:  Follow up as scheduled in Pain Management office in 3-4 weeks.  Patient has received instructions and follow up date and time.    Medications:  Continue previous medications

## 2025-07-29 ENCOUNTER — ANESTHESIA EVENT (OUTPATIENT)
Dept: PAIN MEDICINE | Facility: HOSPITAL | Age: 65
End: 2025-07-29
Payer: OTHER GOVERNMENT

## 2025-07-29 ENCOUNTER — HOSPITAL ENCOUNTER (OUTPATIENT)
Facility: HOSPITAL | Age: 65
Discharge: HOME | End: 2025-07-29
Attending: PAIN MEDICINE | Admitting: PAIN MEDICINE
Payer: OTHER GOVERNMENT

## 2025-07-29 ENCOUNTER — ANESTHESIA (OUTPATIENT)
Dept: PAIN MEDICINE | Facility: HOSPITAL | Age: 65
End: 2025-07-29
Payer: OTHER GOVERNMENT

## 2025-07-29 VITALS
HEIGHT: 72 IN | RESPIRATION RATE: 11 BRPM | OXYGEN SATURATION: 99 % | TEMPERATURE: 97 F | WEIGHT: 181 LBS | BODY MASS INDEX: 24.52 KG/M2 | HEART RATE: 64 BPM | SYSTOLIC BLOOD PRESSURE: 114 MMHG | DIASTOLIC BLOOD PRESSURE: 72 MMHG

## 2025-07-29 DIAGNOSIS — M54.16 LUMBAR RADICULOPATHY: ICD-10-CM

## 2025-07-29 LAB — GLUCOSE SERPL-MCNC: 137 MG/DL (ref 70–105)

## 2025-07-29 PROCEDURE — 62323 NJX INTERLAMINAR LMBR/SAC: CPT | Mod: ,,, | Performed by: PAIN MEDICINE

## 2025-07-29 PROCEDURE — 62323 NJX INTERLAMINAR LMBR/SAC: CPT | Performed by: PAIN MEDICINE

## 2025-07-29 PROCEDURE — 25500020 PHARM REV CODE 255: Performed by: PAIN MEDICINE

## 2025-07-29 PROCEDURE — 27000284 HC CANNULA NASAL: Performed by: NURSE ANESTHETIST, CERTIFIED REGISTERED

## 2025-07-29 PROCEDURE — 63600175 PHARM REV CODE 636 W HCPCS: Performed by: NURSE ANESTHETIST, CERTIFIED REGISTERED

## 2025-07-29 PROCEDURE — 25000003 PHARM REV CODE 250: Performed by: NURSE ANESTHETIST, CERTIFIED REGISTERED

## 2025-07-29 PROCEDURE — 37000008 HC ANESTHESIA 1ST 15 MINUTES: Performed by: PAIN MEDICINE

## 2025-07-29 PROCEDURE — 27000716 HC OXISENSOR PROBE, ANY SIZE: Performed by: NURSE ANESTHETIST, CERTIFIED REGISTERED

## 2025-07-29 PROCEDURE — 82962 GLUCOSE BLOOD TEST: CPT

## 2025-07-29 PROCEDURE — 63600175 PHARM REV CODE 636 W HCPCS: Performed by: PAIN MEDICINE

## 2025-07-29 RX ORDER — SODIUM CHLORIDE 9 MG/ML
INJECTION, SOLUTION INTRAVENOUS CONTINUOUS
Status: DISCONTINUED | OUTPATIENT
Start: 2025-07-29 | End: 2025-07-29 | Stop reason: HOSPADM

## 2025-07-29 RX ORDER — PROPOFOL 10 MG/ML
VIAL (ML) INTRAVENOUS
Status: DISCONTINUED | OUTPATIENT
Start: 2025-07-29 | End: 2025-07-29

## 2025-07-29 RX ORDER — DEXAMETHASONE SODIUM PHOSPHATE 10 MG/ML
INJECTION, SOLUTION INTRA-ARTICULAR; INTRALESIONAL; INTRAMUSCULAR; INTRAVENOUS; SOFT TISSUE CODE/TRAUMA/SEDATION MEDICATION
Status: DISCONTINUED | OUTPATIENT
Start: 2025-07-29 | End: 2025-07-29 | Stop reason: HOSPADM

## 2025-07-29 RX ORDER — IOPAMIDOL 612 MG/ML
INJECTION, SOLUTION INTRATHECAL CODE/TRAUMA/SEDATION MEDICATION
Status: DISCONTINUED | OUTPATIENT
Start: 2025-07-29 | End: 2025-07-29 | Stop reason: HOSPADM

## 2025-07-29 RX ORDER — LIDOCAINE HYDROCHLORIDE 20 MG/ML
INJECTION, SOLUTION EPIDURAL; INFILTRATION; INTRACAUDAL; PERINEURAL
Status: DISCONTINUED | OUTPATIENT
Start: 2025-07-29 | End: 2025-07-29

## 2025-07-29 RX ADMIN — LIDOCAINE HYDROCHLORIDE 100 MG: 20 INJECTION, SOLUTION EPIDURAL; INFILTRATION; INTRACAUDAL; PERINEURAL at 02:07

## 2025-07-29 RX ADMIN — PROPOFOL 100 MG: 10 INJECTION, EMULSION INTRAVENOUS at 02:07

## 2025-07-29 RX ADMIN — PROPOFOL 100 MG: 10 INJECTION, EMULSION INTRAVENOUS at 03:07

## 2025-07-29 RX ADMIN — SODIUM CHLORIDE: 9 INJECTION, SOLUTION INTRAVENOUS at 02:07

## 2025-07-29 NOTE — ANESTHESIA PREPROCEDURE EVALUATION
07/29/2025  Rayray Hutchins is a 65 y.o., male.    Past Medical History:   Diagnosis Date    Allergy     Diabetes mellitus, type 2     Hypertension        Past Surgical History:   Procedure Laterality Date    KNEE SURGERY         No family history on file.    Social History     Socioeconomic History    Marital status: Single   Tobacco Use    Smoking status: Never    Smokeless tobacco: Never   Substance and Sexual Activity    Alcohol use: Not Currently       Current Medications[1]    Review of patient's allergies indicates:  No Known Allergies     Pre-op Assessment    I have reviewed the Patient Summary Reports.     I have reviewed the Nursing Notes. I have reviewed the NPO Status.   I have reviewed the Medications.     Review of Systems  Anesthesia Hx:  No problems with previous Anesthesia             Denies Family Hx of Anesthesia complications.    Denies Personal Hx of Anesthesia complications.                    Social:  Non-Smoker       Cardiovascular:  Exercise tolerance: poor   Hypertension              ECG has been reviewed.                      Hypertension         Musculoskeletal:  Arthritis    Musculoskeletal General/Symptoms: low back pain, joint pain.      Arthritis        Denies Lumbar Spine Disorders   Neurological:     Pain Syndrome   Chronic Pain Syndrome Arthritis                           Endocrine:  Diabetes, type 2    Diabetes, Type 2 Diabetes                      Psych:   Anxiety Disorder.   Depression.             Physical Exam  General: Well nourished, Cooperative, Alert and Oriented    Airway:  Mallampati: II   Mouth Opening: Normal  TM Distance: Normal  Tongue: Normal  Neck ROM: Normal ROM    Dental:  Intact        Anesthesia Plan  Type of Anesthesia, risks & benefits discussed:    Anesthesia Type: Gen Natural Airway  Intra-op Monitoring Plan: Standard ASA Monitors  Post Op Pain  Control Plan: multimodal analgesia  Induction:  IV  Informed Consent: Informed consent signed with the Patient and all parties understand the risks and agree with anesthesia plan.  All questions answered. Patient consented to blood products? Yes  ASA Score: 2  Day of Surgery Review of History & Physical: I have interviewed and examined the patient. I have reviewed the patient's H&P dated: There are no significant changes.     Ready For Surgery From Anesthesia Perspective.     .           [1]   Current Facility-Administered Medications   Medication Dose Route Frequency Provider Last Rate Last Admin    0.9% NaCl infusion   Intravenous Continuous Sissy Carney MD

## 2025-07-29 NOTE — OP NOTE
"Procedure Note    Procedure Date: 7/29/2025    Procedure Performed:  Lumbar interlaminar epidural steroid injection under fluoroscopy at L 4-5    Indications: Patient failed conservative therapy.      Pre-op diagnosis: Lumbar Radiculopathy    Post-op diagnosis: same    Physician: Sissy Carney MD    Anesthesia: MAC    Medications injected: Kenalog 40mg,  2 mL sterile preservative-free normal saline.    Local anesthetic used: 1% Lidocaine, 5 ml    Estimated Blood Loss:  None    Complications:  None    Technique:  The patient was interviewed in the holding area and Risks/Benefits were discussed, including, but not limited to, the possibility of new or different pain, bleeding or infection.   All questions were answered.  The patient understood and accepted risks.  Consent was verified and signed.   A time-out was taken to identify patient and procedure prior to starting the procedure. The patient was placed in the prone position on the fluoroscopy table. The area of the lumbar spine was prepped with Chloraprep and draped in a sterile manner. The L 4-5  interspace was identified and marked under AP fluoroscopy. The skin and subcutaneous tissues overlying the targeted interspace were anesthetized with 3-5 mL of 1% lidocaine using a 25G 1.5" needle.  A 20G  3.5" Tuohy epidural needle was directed toward the interspace under fluoroscopic guidance until the ligamentum flavum was engaged. From this point, a loss of resistance technique with a pulsator syringe a was used to identify entrance of the needle into the epidural space. Once loss of resistance was observed 3mL of Isovue contrast solution was injected. An appropriate epidurogram was noted.  A 3mL mixture consisting of saline and 40 mg of kenalog was injected slowly and without resistance.  The needle was  removed and a sterile Band-Aid dressing was applied to the puncture site.  The patient tolerated the procedure well and was transferred to the .AC. in stable " condition.  The patient was monitored after the procedure and was given post-procedure and discharge instructions to follow at home. The patient was discharged in a stable condition and accompanied by an adult .    Epidurogram:  5 mL allotment of Isovue M 300 contrast revealed excellent delineation from L 3-5. There were no filling defects or obstruction to dye flow noted.  There was no  intravascular or intrathecal spread noted with dye flow.

## 2025-07-29 NOTE — H&P
Subjective:         Patient ID: Rayray Hutchins is a 65 y.o. male.    Chief Complaint: No chief complaint on file.    Pain  This is a chronic problem. The current episode started more than 1 year ago. The problem occurs daily. The problem has been unchanged. Associated symptoms include arthralgias. Pertinent negatives include no change in bowel habit, chest pain, chills, coughing, fever, sore throat, vertigo or vomiting.     Review of Systems   Constitutional:  Negative for activity change, chills, fever and unexpected weight change.   HENT:  Negative for drooling, ear discharge, ear pain, facial swelling, nosebleeds, sore throat, trouble swallowing, voice change and goiter.    Eyes:  Negative for photophobia, pain, discharge, redness and visual disturbance.   Respiratory:  Negative for apnea, cough, choking, chest tightness, shortness of breath, wheezing and stridor.    Cardiovascular:  Negative for chest pain, palpitations and leg swelling.   Gastrointestinal:  Negative for abdominal distention, change in bowel habit, diarrhea, vomiting and fecal incontinence.   Endocrine: Negative for cold intolerance, heat intolerance, polydipsia, polyphagia and polyuria.   Genitourinary:  Negative for bladder incontinence, dysuria, flank pain and frequency.   Musculoskeletal:  Positive for arthralgias, back pain and leg pain.   Integumentary:  Negative for color change and pallor.   Neurological:  Negative for dizziness, vertigo, seizures, syncope, facial asymmetry, speech difficulty, light-headedness, coordination difficulties and memory loss.   Hematological:  Negative for adenopathy. Does not bruise/bleed easily.   Psychiatric/Behavioral:  Negative for agitation, behavioral problems, confusion, decreased concentration, dysphoric mood, hallucinations and self-injury. The patient is not nervous/anxious and is not hyperactive.            Past Medical History:   Diagnosis Date    Allergy     Diabetes mellitus, type 2      Hypertension      Past Surgical History:   Procedure Laterality Date    KNEE SURGERY       Social History[1]  No family history on file.  Review of patient's allergies indicates:  No Known Allergies     Objective:  There were no vitals filed for this visit.          Physical Exam  Vitals and nursing note reviewed. Exam conducted with a chaperone present.   Constitutional:       General: He is awake. He is not in acute distress.     Appearance: Normal appearance. He is not ill-appearing, toxic-appearing or diaphoretic.   HENT:      Head: Normocephalic and atraumatic.      Nose: Nose normal.      Mouth/Throat:      Mouth: Mucous membranes are moist.      Pharynx: Oropharynx is clear.   Eyes:      Conjunctiva/sclera: Conjunctivae normal.      Pupils: Pupils are equal, round, and reactive to light.   Cardiovascular:      Rate and Rhythm: Normal rate.   Pulmonary:      Effort: Pulmonary effort is normal. No respiratory distress.   Abdominal:      Palpations: Abdomen is soft.      Tenderness: There is no guarding.   Musculoskeletal:         General: Normal range of motion.      Cervical back: Normal range of motion and neck supple. No rigidity.      Thoracic back: Tenderness present.      Lumbar back: Tenderness present.   Skin:     General: Skin is warm and dry.      Coloration: Skin is not jaundiced or pale.   Neurological:      General: No focal deficit present.      Mental Status: He is alert and oriented to person, place, and time. Mental status is at baseline.      Cranial Nerves: No cranial nerve deficit (II-XII).   Psychiatric:         Mood and Affect: Mood normal.         Behavior: Behavior normal. Behavior is cooperative.         Thought Content: Thought content normal.           MRI Lumbar Spine Without Contrast  Narrative: EXAMINATION:  MRI LUMBAR SPINE WITHOUT CONTRAST    CLINICAL HISTORY:  Lumbar radiculopathy, symptoms persist with conservative treatment; Radiculopathy, lumbar region    TECHNIQUE:  Sagittal  T1, T2, stir and axial T1-T2 imaging of the lumbar spine without contrast.    COMPARISON:  Lumbar radiograph 04/21/2025    FINDINGS:  Straightening of the lumbar lordosis similar to prior.  Lumbar vertebral body heights, and contours are relatively stable without evidence for acute fracture or subluxation.  There is degenerative disc disease with height loss slight disc desiccation and endplate degenerative change most pronounced in the L5/S1 level with Modic type 2 endplate degeneration.    The distal spinal cord and conus is normal in signal and contour tip of the conus approximates the superior L2 level.    No aneurysmal dilatation of the visualized abdominal aorta.  Subcentimeter fluid signal focus in the left kidney suggestive for renal cyst.    T12/L1 through L1/L2: No significant disc bulge, central canal or neural foraminal stenosis.    L2/L3: No significant disc bulge central canal or neural foraminal stenosis.    L3/L4:No significant disc bulge, central canal or neural foraminal stenosis.    L4/L5:Posterior disc osteophyte with ligamentum flavum hypertrophy and facet arthropathy with dorsal epidural lipomatosis with mild moderate central canal stenosis and bilateral neural foraminal stenosis.    L5/S1: Posterior disc osteophyte with facet joint arthropathy without significant central canal stenosis and severe right and moderate to severe left neural foraminal stenosis  Impression: Degenerative change lower lumbar spine as detailed above most pronounced at L4/L5 with posterior disc osteophyte, ligamentum flavum hypertrophy and facet arthropathy mild moderate central canal stenosis and bilateral neural foraminal stenosis.  Please note there is greater degree of neural foraminal stenosis at L5/S1 with severe right and moderate to severe left neural foraminal stenosis.    Electronically signed by: Estiven Burleson DO  Date:    05/09/2025  Time:    15:02       Admission on 06/10/2025, Discharged on 06/10/2025    Component Date Value Ref Range Status    POC Glucose 06/10/2025 118 (H)  70 - 105 mg/dL Final         No orders of the defined types were placed in this encounter.      Requested Prescriptions      No prescriptions requested or ordered in this encounter       Assessment:     1. Lumbar radiculopathy             X-ray lumbar spine [facility] April 21, 2025  Mild broad dextrocurvature. Sagittal alignment is within normal limits. Presumed lumbosacral transitional anatomy with sacralization of L5. Mild degenerative disc height loss most pronounced at L4-L5 with shallow endplate centered osteophytes. No fractures.       MRI lumbar spine [facility] May 9, 2025  FINDINGS:  Straightening of the lumbar lordosis similar to prior.  Lumbar vertebral body heights, and contours are relatively stable without evidence for acute fracture or subluxation.  There is degenerative disc disease with height loss slight disc desiccation and endplate degenerative change most pronounced in the L5/S1 level with Modic type 2 endplate degeneration.     The distal spinal cord and conus is normal in signal and contour tip of the conus approximates the superior L2 level.     No aneurysmal dilatation of the visualized abdominal aorta.  Subcentimeter fluid signal focus in the left kidney suggestive for renal cyst.     T12/L1 through L1/L2: No significant disc bulge, central canal or neural foraminal stenosis.     L2/L3: No significant disc bulge central canal or neural foraminal stenosis.     L3/L4:No significant disc bulge, central canal or neural foraminal stenosis.     L4/L5:Posterior disc osteophyte with ligamentum flavum hypertrophy and facet arthropathy with dorsal epidural lipomatosis with mild moderate central canal stenosis and bilateral neural foraminal stenosis.     L5/S1: Posterior disc osteophyte with facet joint arthropathy without significant central canal stenosis and severe right and moderate to severe left neural foraminal  stenosis     Impression:     Degenerative change lower lumbar spine as detailed above most pronounced at L4/L5 with posterior disc osteophyte, ligamentum flavum hypertrophy and facet arthropathy mild moderate central canal stenosis and bilateral neural foraminal stenosis.  Please note there is greater degree of neural foraminal stenosis at L5/S1 with severe right and moderate to severe left neural foraminal stenosis.       Plan:    Not using narcotics from our office May 2025    [facility] patient    Was seen in [facility] many years ago had back injections he is unsure what kind of injection  Dr Saleh, 2016      Seen as new patient April 7, 2025    Follow-up after MRI lumbar spine x-ray lumbar spine    X-ray pelvis and hips not completed ordered April 7, 2025 he states he was worried [facility] might not [facility]      Complaining back pain buttock and leg pain numbness and tingling radicular in nature ongoing for many years he states he had epidural steroid injections several years ago which did help he has consider repeating epidural steroid injection     Requesting procedure for lumbar radiculopathy symptoms    He states he will discuss with his [facility] provider [facility]    Patient is not able to perform activities daily living due to pain such as house chores grocery shopping cooking and cleaning personal hygiene patient can not stand, sit, walk ride for more than 15 minutes at a time due to pain    Patient will/shall continue home exercise program as directed ongoing x1 year, 3-4 days per week 30-45 minutes per session  Patient has been doing home exercises since last procedure Date --------  Stretching Exercises   Stretching exercises keep your muscles flexible. They also stop them from getting tight. Start by doing each of these stretches 2 to 3 times. In order for your body to make changes, you will need to hold these stretches for 20 to 30 seconds. Try to do the  stretches 2 to 3 times each day.   Do all exercises slowly.   Do not bounce when doing stretches.    Single knee to chest stretches ? Lie on your back, bend your knees and have your feet flat on the floor. Pull one knee towards your chest until you feel a stretch in your lower back and buttock area. Repeat with the other knee. If you have knee problems, pull your knee up by grabbing the back of your thigh instead of the front of your knee. You can also do this exercise by grabbing both knees at the same time.    Lower trunk rotations ? While lying on your back, bend your knees and have your feet flat on the floor. Keep your legs together and then drop them to one side. Be sure to keep both of your shoulders touching the floor until you feel a stretch in the muscles at the side of the back. Repeat on the other side.    Lower back stretches seated ? Sit in a chair with your feet spread about shoulder width apart. Then, lean forward until you feel a stretch in your lower back.  Strengthening Exercises   Strengthening exercises keep your muscles firm and strong. Start by repeating each exercise 2 to 3 times.     Work up to doing each exercise 10 times.     Hold each exercise for 3 to 5 seconds. Try to do the exercises 2 to 3 times each day.     Do all exercises slowly.    Shoulder blade squeezes ? Pinch your shoulder blades together on your upper back and hold 3 to 5 seconds. Be sure you are sitting with good posture and make sure your shoulders do not raise up when you do this exercise. Relax.    Pelvic tilts ? Lie on your back with your knees bent and feet flat on the floor. Tighten your stomach muscles and press your lower back down to the floor. Relax.    Hip lifts ? Lie on your back with your knees bent and feet flat on the floor. Tighten your stomach muscles and lift your buttocks off the floor. Relax.    Please see detailed exercises attached to note with diagram    Indications for this procedure for this  specific patient include the following     - Injections being provided as part of a comprehensive pain management program.    - Pt has failed 6 weeks of conservative therapy including oral meds, PT and or home exercise program which has been discussed with the patient   - Injection being provided for suspected radicular pain.    - Pain scale of greater than or equal to 3/10 with functional impairment  - No evidence of local or systemic infection, bleeding tendency or unstable medical condition.    - Pain is causing significant functional limitation resulting in diminished quality of life and impaired age appropriate ADL's.   - Repeat injections are done no sooner than 7 days after the previous injection  - Epidural done for suspected radicular pain along dermatome of nerve   - Epidural done to differentiate level of radicular nerve root pain   -procedure done prior to surgical consideration  - Repeat injections done only when pt reports 50% improvement in pain from previous injections    -increased level of function  - patient is aware if they take any NSAIDs/anticoagulant prior to procedure procedure will be postponed, this was listed on the preop instructions and highlighted, list of NSAIDs/anticoagulant reviewed with patient to include methotrexate  - Injection done at L4-5 level(s) which is consistent with patient's dermatomal pain complaint  The planned medically necessary  surgical procedure is performed in a hospital outpatient department and not in an ambulatory surgical center due to:     -there is no geographically assessable ambulatory surgery center that has the  necessary equipment and fluoroscopy needed for the procedure     -there is no geographically assessable ambulatory surgical center available at which the physician has privileges     -an ASC's  specific  guideline regarding the individuals weight or health conditions that prevent the use of an ASC     -done under fluoro  Monitor anesthesia  request is medically indicated for the scheduled nerve block procedure due to:  1- needle phobia and anxiety, placing  the patient at risk during the provided service.  2-patient has an ASA class greater than 3 and requires constant presence of an anesthesiologist during the procedure,   3-patient has severe problems hard to lie still  4-patient suffers from chronic pain and is unable to function due to  diminished ADLs    Schedule lumbar L4/5 ZOILA # 1 lumbar radiculopathy    Dr. Carney     Bring original prescription medication bottles/container/box with labels to each visit               Review of Systems   Constitutional:  Negative for activity change, chills, fever and unexpected weight change.   HENT:  Negative for drooling, ear discharge, ear pain, facial swelling, nosebleeds, sore throat, trouble swallowing and voice change.    Eyes:  Negative for photophobia, pain, discharge, redness and visual disturbance.   Respiratory:  Negative for apnea, cough, choking, chest tightness, shortness of breath, wheezing and stridor.    Cardiovascular:  Negative for chest pain, palpitations and leg swelling.   Gastrointestinal:  Negative for abdominal distention, change in bowel habit, diarrhea and vomiting.   Genitourinary:  Negative for bladder incontinence, dysuria, flank pain and frequency.   Musculoskeletal:  Positive for arthralgias and back pain.   Skin:  Negative for color change and pallor.   Neurological:  Negative for dizziness, vertigo, seizures, syncope, facial asymmetry, speech difficulty, light-headedness and disturbances in coordination.   Endo/Heme/Allergies:  Negative for cold intolerance, heat intolerance, polydipsia, polyphagia and adenopathy. Does not bruise/bleed easily.   Psychiatric/Behavioral:  Negative for agitation, behavioral problems, confusion, decreased concentration, dysphoric mood, hallucinations and self-injury. The patient is not nervous/anxious and is not hyperactive.             [1]    Social History  Socioeconomic History    Marital status: Single   Tobacco Use    Smoking status: Never    Smokeless tobacco: Never   Substance and Sexual Activity    Alcohol use: Not Currently

## 2025-07-29 NOTE — BRIEF OP NOTE
The  Discharge Note  Short Stay    Admit Date: 7/29/2025    Discharge Date: 7/29/2025    Attending Physician: Sissy Carney     Discharge Provider: Sissy Carney    Diagnosis:  Lumbar radiculopathy    Procedure performed:  L4-5 epidural steroid injection and epidurogram under fluoroscopy    Findings: Procedure tolerated well and without complications. Consistent with diagnosis.    EBL: 0cc    Specimens: None    Discharged Condition: Good    Final Diagnoses: Lumbar radiculopathy [M54.16]    Disposition: Home or Self Care    Hospital Course: No complications, uneventful    Outcome of Hospitalization, Treatment, Procedure, or Surgery:  Patient was admitted for outpatient interventional pain management procedure. The patient tolerated the procedure well with no complications.    Follow up/Patient Instructions:  Follow up as scheduled in Pain Management office in 3-4 weeks.  Patient has received instructions and follow up date and time.    Medications:  Continue previous medications

## 2025-07-29 NOTE — DISCHARGE SUMMARY
[facility] - Pain Management  Discharge Note  Short Stay    Procedure(s) (LRB):  INJECTION, STEROID, SPINE, LUMBAR, EPIDURAL L4-5 ZOILA (N/A)      OUTCOME: Patient tolerated treatment/procedure well without complication and is now ready for discharge.    DISPOSITION: Home or Self Care    FINAL DIAGNOSIS:  Lumbar radiculopathy    FOLLOWUP: In clinic    DISCHARGE INSTRUCTIONS:  See nurse's notes     TIME SPENT ON DISCHARGE: 5 minutes

## 2025-07-29 NOTE — ANESTHESIA POSTPROCEDURE EVALUATION
Anesthesia Post Evaluation    Patient: Rayray Hutchins    Procedure(s) Performed: Procedure(s) (LRB):  INJECTION, STEROID, SPINE, LUMBAR, EPIDURAL L4-5 ZOILA (N/A)    Final Anesthesia Type: MAC      Patient location during evaluation: PACU  Patient participation: Yes- Able to Participate  Level of consciousness: awake and alert  Post-procedure vital signs: reviewed and stable  Airway patency: patent    PONV status at discharge: No PONV  Anesthetic complications: no      Cardiovascular status: blood pressure returned to baseline  Respiratory status: spontaneous ventilation  Hydration status: euvolemic  Follow-up not needed.              Vitals Value Taken Time   /55 07/29/25 15:06   Temp 36.1 °C (97 °F) 07/29/25 15:06   Pulse 68 07/29/25 15:06   Resp 10 07/29/25 15:06   SpO2 97 % 07/29/25 15:06         No case tracking events are documented in the log.      Pain/Warren Score: Warren Score: 8 (7/29/2025  3:06 PM)

## 2025-07-29 NOTE — PLAN OF CARE
Plan:  D/c pt via wheelchair at 1552  Informed pt if does not void in 8 hours to go to ER. Notify if redness, drainage, from injection site or fever over next 3-4 days. Rest and drink plenty of fluids for the remainder of the day. No lifting over 5 lbs. For the remainder of the day. Continue regular medications as prescribed. May take pain medications as prescribed.     Pain improved 00%  Pre-procedure pain: 5  Post-procedure pain: 0

## 2025-07-29 NOTE — TRANSFER OF CARE
Anesthesia Transfer of Care Note    Patient: Rayray Hutchins    Procedure(s) Performed: Procedure(s) (LRB):  INJECTION, STEROID, SPINE, LUMBAR, EPIDURAL L4-5 ZOILA (N/A)    Patient location: Other: Pain    Anesthesia Type: MAC    Transport from OR: Transported from OR on room air with adequate spontaneous ventilation. Continuous SpO2 monitoring in transport. Continuous ECG monitoring in transport    Post pain: adequate analgesia    Post assessment: no apparent anesthetic complications    Post vital signs: stable    Level of consciousness: responds to stimulation, awake, alert and oriented    Nausea/Vomiting: no nausea/vomiting    Complications: none    Transfer of care protocol was followed      Last vitals: Visit Vitals  BP (!) 100/55 (BP Location: Right arm, Patient Position: Lying)   Pulse 68   Temp 36.1 °C (97 °F) (Oral)   Resp 10   Ht 6' (1.829 m)   Wt 82.1 kg (181 lb)   SpO2 97%   BMI 24.55 kg/m²

## (undated) DEVICE — CATH INTROCAN SAF 2 IV 20GX1IN

## (undated) DEVICE — SLIPPER FALL PREV YEL BARIAT

## (undated) DEVICE — SET EXT STD BORE CATH 7.6IN

## (undated) DEVICE — KIT IV START

## (undated) DEVICE — NDL TUOHY EPIDURAL 20GX3.5IN

## (undated) DEVICE — APPLICATOR CHLORAPREP ORN 26ML

## (undated) DEVICE — SYR EPILOR LUER-LOK LOR 7ML

## (undated) DEVICE — TRAY NERVE BLOCK UNIV 10/CA

## (undated) DEVICE — SOL CONTINU-FLO SET 2 LAV

## (undated) DEVICE — CATH IV INTROCAN 24G X 3/4

## (undated) DEVICE — GLOVE SENSICARE PI SURG 6.5

## (undated) DEVICE — CATH INTROCAN SAF 2 IV 22GX1IN

## (undated) DEVICE — SET EXTENSION CLEARLINK 2INJ